# Patient Record
Sex: FEMALE | Race: WHITE | NOT HISPANIC OR LATINO | Employment: OTHER | URBAN - METROPOLITAN AREA
[De-identification: names, ages, dates, MRNs, and addresses within clinical notes are randomized per-mention and may not be internally consistent; named-entity substitution may affect disease eponyms.]

---

## 2023-05-08 ENCOUNTER — TELEPHONE (OUTPATIENT)
Dept: PRIMARY CARE | Facility: CLINIC | Age: 63
End: 2023-05-08
Payer: COMMERCIAL

## 2023-05-08 DIAGNOSIS — G44.209 TENSION HEADACHE: Primary | ICD-10-CM

## 2023-05-08 NOTE — TELEPHONE ENCOUNTER
"Patient calling in stating that she gets headaches that are \"inflammation\" and Dr. Shelley usually calls in a z pack for her. Patient states that she has been getting headaches at night and would like to know if Dr. Shelley would call her in a zpack?    "

## 2023-05-09 RX ORDER — METHYLPREDNISOLONE 4 MG/1
TABLET ORAL
Qty: 21 TABLET | Refills: 0 | Status: SHIPPED | OUTPATIENT
Start: 2023-05-09 | End: 2023-09-27 | Stop reason: ALTCHOICE

## 2023-06-01 DIAGNOSIS — R00.2 PALPITATIONS: ICD-10-CM

## 2023-06-01 PROBLEM — L03.119 CELLULITIS OF LOWER EXTREMITY: Status: ACTIVE | Noted: 2023-06-01

## 2023-06-01 PROBLEM — R51.9 HEADACHE DISORDER: Status: RESOLVED | Noted: 2023-06-01 | Resolved: 2023-06-01

## 2023-06-01 PROBLEM — M79.605 DIFFUSE PAIN IN LEFT LOWER EXTREMITY: Status: RESOLVED | Noted: 2023-06-01 | Resolved: 2023-06-01

## 2023-06-01 PROBLEM — I83.90 VARICOSE VEINS OF LOWER EXTREMITY: Status: ACTIVE | Noted: 2023-06-01

## 2023-06-01 PROBLEM — N39.0 INFECTION OF URINARY TRACT: Status: RESOLVED | Noted: 2023-06-01 | Resolved: 2023-06-01

## 2023-06-01 PROBLEM — J34.89 NASAL CONGESTION WITH RHINORRHEA: Status: RESOLVED | Noted: 2023-06-01 | Resolved: 2023-06-01

## 2023-06-01 PROBLEM — H69.91 DYSFUNCTION OF RIGHT EUSTACHIAN TUBE: Status: ACTIVE | Noted: 2023-06-01

## 2023-06-01 PROBLEM — D21.9 FIBROIDS: Status: ACTIVE | Noted: 2023-06-01

## 2023-06-01 PROBLEM — R20.2 PARESTHESIA: Status: ACTIVE | Noted: 2023-06-01

## 2023-06-01 PROBLEM — S61.012A THUMB LACERATION, LEFT, INITIAL ENCOUNTER: Status: RESOLVED | Noted: 2023-06-01 | Resolved: 2023-06-01

## 2023-06-01 PROBLEM — L03.032 PARONYCHIA OF TOE OF LEFT FOOT: Status: RESOLVED | Noted: 2023-06-01 | Resolved: 2023-06-01

## 2023-06-01 PROBLEM — E66.3 OVERWEIGHT: Status: RESOLVED | Noted: 2023-06-01 | Resolved: 2023-06-01

## 2023-06-01 PROBLEM — K29.70 GASTRITIS: Status: ACTIVE | Noted: 2023-06-01

## 2023-06-01 PROBLEM — R07.89 ATYPICAL CHEST PAIN: Status: ACTIVE | Noted: 2023-06-01

## 2023-06-01 PROBLEM — E55.9 VITAMIN D DEFICIENCY: Status: ACTIVE | Noted: 2023-06-01

## 2023-06-01 PROBLEM — N92.0 MENORRHAGIA: Status: ACTIVE | Noted: 2023-06-01

## 2023-06-01 PROBLEM — M62.838 NECK MUSCLE SPASM: Status: RESOLVED | Noted: 2023-06-01 | Resolved: 2023-06-01

## 2023-06-01 PROBLEM — F43.20 GRIEF REACTION: Status: RESOLVED | Noted: 2023-06-01 | Resolved: 2023-06-01
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
## 2023-06-01 PROBLEM — R05.9 COUGH: Status: RESOLVED | Noted: 2023-06-01 | Resolved: 2023-06-01

## 2023-06-01 PROBLEM — H90.3 ASYMMETRICAL SENSORINEURAL HEARING LOSS: Status: ACTIVE | Noted: 2023-06-01

## 2023-06-01 PROBLEM — J45.901 EXACERBATION OF ASTHMA (HHS-HCC): Status: ACTIVE | Noted: 2023-06-01

## 2023-06-01 PROBLEM — U07.1 COVID-19: Status: RESOLVED | Noted: 2023-06-01 | Resolved: 2023-06-01

## 2023-06-01 PROBLEM — H81.391 PERIPHERAL VERTIGO INVOLVING RIGHT EAR: Status: ACTIVE | Noted: 2023-06-01

## 2023-06-01 PROBLEM — R79.89 LOW TSH LEVEL: Status: ACTIVE | Noted: 2023-06-01

## 2023-06-01 PROBLEM — R93.0 ABNORMAL MRI OF THE HEAD: Status: RESOLVED | Noted: 2023-06-01 | Resolved: 2023-06-01

## 2023-06-01 PROBLEM — G44.209 TENSION-TYPE HEADACHE: Status: ACTIVE | Noted: 2023-06-01

## 2023-06-01 PROBLEM — N95.2 ATROPHIC VAGINITIS: Status: ACTIVE | Noted: 2023-06-01

## 2023-06-01 PROBLEM — H93.11 TINNITUS, RIGHT: Status: ACTIVE | Noted: 2023-06-01

## 2023-06-01 PROBLEM — M67.432 GANGLION OF LEFT WRIST: Status: RESOLVED | Noted: 2023-06-01 | Resolved: 2023-06-01

## 2023-06-01 PROBLEM — L72.3 INFLAMED SEBACEOUS CYST: Status: RESOLVED | Noted: 2023-06-01 | Resolved: 2023-06-01

## 2023-06-01 PROBLEM — R09.81 NASAL CONGESTION WITH RHINORRHEA: Status: RESOLVED | Noted: 2023-06-01 | Resolved: 2023-06-01

## 2023-06-01 PROBLEM — F41.9 ANXIETY DISORDER: Status: ACTIVE | Noted: 2023-06-01

## 2023-06-01 PROBLEM — F43.21 GRIEF REACTION: Status: RESOLVED | Noted: 2023-06-01 | Resolved: 2023-06-01

## 2023-06-01 PROBLEM — E04.2 MULTIPLE THYROID NODULES: Status: ACTIVE | Noted: 2023-06-01

## 2023-06-01 PROBLEM — J01.00 ACUTE NON-RECURRENT MAXILLARY SINUSITIS: Status: RESOLVED | Noted: 2023-06-01 | Resolved: 2023-06-01

## 2023-06-01 PROBLEM — R42 VERTIGO: Status: ACTIVE | Noted: 2023-06-01

## 2023-06-01 RX ORDER — METRONIDAZOLE 500 MG/1
500 TABLET ORAL EVERY 8 HOURS
Qty: 21 TABLET | Refills: 0 | COMMUNITY
Start: 2023-04-30 | End: 2023-05-07

## 2023-06-01 RX ORDER — OMEPRAZOLE 40 MG/1
40 CAPSULE, DELAYED RELEASE ORAL 2 TIMES DAILY
COMMUNITY
Start: 2022-12-18 | End: 2024-03-21 | Stop reason: SDUPTHER

## 2023-06-01 RX ORDER — MONTELUKAST SODIUM 10 MG/1
10 TABLET ORAL NIGHTLY
COMMUNITY
End: 2024-06-03 | Stop reason: SDUPTHER

## 2023-06-01 RX ORDER — TIMOLOL MALEATE 5 MG/ML
SOLUTION/ DROPS OPHTHALMIC
COMMUNITY
Start: 2023-04-30

## 2023-06-01 RX ORDER — SUCRALFATE 1 G/1
TABLET ORAL
COMMUNITY
Start: 2022-10-14

## 2023-06-01 RX ORDER — PROPRANOLOL HYDROCHLORIDE 60 MG/1
60 CAPSULE, EXTENDED RELEASE ORAL DAILY
Qty: 90 CAPSULE | Refills: 3 | Status: SHIPPED | OUTPATIENT
Start: 2023-06-01 | End: 2024-06-03 | Stop reason: SDUPTHER

## 2023-06-01 RX ORDER — FLUTICASONE PROPIONATE 50 MCG
SPRAY, SUSPENSION (ML) NASAL
COMMUNITY
Start: 2020-09-02

## 2023-06-01 RX ORDER — ALBUTEROL SULFATE 90 UG/1
AEROSOL, METERED RESPIRATORY (INHALATION)
COMMUNITY
End: 2024-03-07 | Stop reason: SDUPTHER

## 2023-06-01 RX ORDER — PROPRANOLOL HYDROCHLORIDE 60 MG/1
60 CAPSULE, EXTENDED RELEASE ORAL DAILY
COMMUNITY
End: 2023-06-01 | Stop reason: SDUPTHER

## 2023-06-01 RX ORDER — ESTRADIOL 10 UG/1
INSERT VAGINAL
COMMUNITY
Start: 2019-10-10

## 2023-06-14 ENCOUNTER — OFFICE VISIT (OUTPATIENT)
Dept: PRIMARY CARE | Facility: CLINIC | Age: 63
End: 2023-06-14
Payer: COMMERCIAL

## 2023-06-14 VITALS
HEART RATE: 71 BPM | DIASTOLIC BLOOD PRESSURE: 60 MMHG | TEMPERATURE: 95.3 F | WEIGHT: 241 LBS | OXYGEN SATURATION: 100 % | SYSTOLIC BLOOD PRESSURE: 100 MMHG | BODY MASS INDEX: 47.32 KG/M2 | HEIGHT: 60 IN

## 2023-06-14 DIAGNOSIS — H33.322 RETINA HOLE, LEFT: ICD-10-CM

## 2023-06-14 DIAGNOSIS — H93.233 HYPERACUSIS OF BOTH EARS: ICD-10-CM

## 2023-06-14 DIAGNOSIS — F41.1 GENERALIZED ANXIETY DISORDER: ICD-10-CM

## 2023-06-14 DIAGNOSIS — H90.3 ASYMMETRICAL SENSORINEURAL HEARING LOSS: ICD-10-CM

## 2023-06-14 DIAGNOSIS — K57.32 DIVERTICULITIS OF LARGE INTESTINE WITHOUT PERFORATION OR ABSCESS WITHOUT BLEEDING: Primary | ICD-10-CM

## 2023-06-14 PROCEDURE — 99214 OFFICE O/P EST MOD 30 MIN: CPT | Performed by: FAMILY MEDICINE

## 2023-06-14 ASSESSMENT — PATIENT HEALTH QUESTIONNAIRE - PHQ9
1. LITTLE INTEREST OR PLEASURE IN DOING THINGS: NOT AT ALL
SUM OF ALL RESPONSES TO PHQ9 QUESTIONS 1 AND 2: 0
2. FEELING DOWN, DEPRESSED OR HOPELESS: NOT AT ALL

## 2023-06-14 ASSESSMENT — PAIN SCALES - GENERAL: PAINLEVEL: 3

## 2023-06-14 NOTE — PROGRESS NOTES
Subjective   Patient ID: Lucia Shields is a 62 y.o. female.    Patient comes in today with multiple complaints.  She has a history of diverticulosis and feels like she had a flareup. She was seen elsewhere and that is much better.  She would like to see a gastroenterologist.  She may be due for scope.  She has no diarrhea or blood in the stool at this time.  She is overweight with a BMI of 47.  She has been trying to lose weight through exercise and caloric restriction.  She does not smoke.  She drinks socially.  Her asthma has been stable.  She has a history of anxiety.  She would like to see an audiologist due to hearing loss.  She has seen cardiology for history of palpitations and chest pain.  Most likely due to anxiety.        Review of Systems   Constitutional:  Positive for fatigue. Negative for fever and unexpected weight change.   HENT:  Positive for hearing loss. Negative for congestion, ear pain, sore throat and trouble swallowing.    Eyes:  Negative for pain and visual disturbance.   Respiratory:  Negative for cough and shortness of breath.    Cardiovascular:  Negative for chest pain, palpitations and leg swelling.   Gastrointestinal:  Positive for abdominal pain. Negative for blood in stool, diarrhea, nausea and vomiting.   Genitourinary:  Negative for dysuria, frequency, hematuria and urgency.   Musculoskeletal:  Negative for joint swelling.   Skin:  Negative for pallor and rash.   Neurological:  Negative for dizziness, syncope, weakness, numbness and headaches.   Psychiatric/Behavioral:  Negative for confusion, decreased concentration, hallucinations and suicidal ideas. The patient is nervous/anxious.      Vitals:    06/14/23 1611   BP: 100/60   Pulse: 71   Temp: 35.2 °C (95.3 °F)   SpO2: 100%      Objective   Physical Exam  Constitutional:       Appearance: Normal appearance. She is obese.   Cardiovascular:      Rate and Rhythm: Normal rate and regular rhythm.      Heart sounds: Normal heart  sounds.   Pulmonary:      Effort: Pulmonary effort is normal.      Breath sounds: Normal breath sounds.   Musculoskeletal:      Cervical back: Neck supple.   Skin:     General: Skin is warm and dry.   Neurological:      General: No focal deficit present.      Mental Status: She is alert.   Psychiatric:         Mood and Affect: Mood normal.         Speech: Speech normal.         Behavior: Behavior normal.         Cognition and Memory: Cognition normal.         Assessment/Plan   There are no diagnoses linked to this encounter.

## 2023-06-19 PROBLEM — H93.233 HYPERACUSIS OF BOTH EARS: Status: ACTIVE | Noted: 2023-06-19

## 2023-06-19 PROBLEM — K57.32 DIVERTICULITIS OF LARGE INTESTINE WITHOUT PERFORATION OR ABSCESS WITHOUT BLEEDING: Status: ACTIVE | Noted: 2023-06-19

## 2023-06-19 PROBLEM — K57.92 DIVERTICULITIS: Status: ACTIVE | Noted: 2023-06-19

## 2023-06-19 ASSESSMENT — ENCOUNTER SYMPTOMS
ABDOMINAL PAIN: 1
NERVOUS/ANXIOUS: 1
NUMBNESS: 0
SHORTNESS OF BREATH: 0
WEAKNESS: 0
JOINT SWELLING: 0
DIZZINESS: 0
FATIGUE: 1
FEVER: 0
FREQUENCY: 0
VOMITING: 0
SORE THROAT: 0
DECREASED CONCENTRATION: 0
DIARRHEA: 0
DYSURIA: 0
UNEXPECTED WEIGHT CHANGE: 0
HALLUCINATIONS: 0
HEMATURIA: 0
BLOOD IN STOOL: 0
PALPITATIONS: 0
TROUBLE SWALLOWING: 0
HEADACHES: 0
CONFUSION: 0
EYE PAIN: 0
COUGH: 0
NAUSEA: 0

## 2023-06-26 ENCOUNTER — LAB (OUTPATIENT)
Dept: LAB | Facility: LAB | Age: 63
End: 2023-06-26
Payer: COMMERCIAL

## 2023-06-26 DIAGNOSIS — K57.32 DIVERTICULITIS OF LARGE INTESTINE WITHOUT PERFORATION OR ABSCESS WITHOUT BLEEDING: ICD-10-CM

## 2023-06-26 DIAGNOSIS — Z00.00 ENCOUNTER FOR HEALTH MAINTENANCE EXAMINATION: ICD-10-CM

## 2023-06-26 LAB
ALANINE AMINOTRANSFERASE (SGPT) (U/L) IN SER/PLAS: 12 U/L (ref 7–45)
ALBUMIN (G/DL) IN SER/PLAS: 4 G/DL (ref 3.4–5)
ALKALINE PHOSPHATASE (U/L) IN SER/PLAS: 58 U/L (ref 33–136)
ANION GAP IN SER/PLAS: 10 MMOL/L (ref 10–20)
APPEARANCE, URINE: CLEAR
ASPARTATE AMINOTRANSFERASE (SGOT) (U/L) IN SER/PLAS: 12 U/L (ref 9–39)
BASOPHILS (10*3/UL) IN BLOOD BY AUTOMATED COUNT: 0.03 X10E9/L (ref 0–0.1)
BASOPHILS/100 LEUKOCYTES IN BLOOD BY AUTOMATED COUNT: 0.5 % (ref 0–2)
BILIRUBIN TOTAL (MG/DL) IN SER/PLAS: 0.7 MG/DL (ref 0–1.2)
BILIRUBIN, URINE: NEGATIVE
BLOOD, URINE: NEGATIVE
CALCIDIOL (25 OH VITAMIN D3) (NG/ML) IN SER/PLAS: 35 NG/ML
CALCIUM (MG/DL) IN SER/PLAS: 9.3 MG/DL (ref 8.6–10.6)
CARBON DIOXIDE, TOTAL (MMOL/L) IN SER/PLAS: 29 MMOL/L (ref 21–32)
CHLORIDE (MMOL/L) IN SER/PLAS: 108 MMOL/L (ref 98–107)
CHOLESTEROL (MG/DL) IN SER/PLAS: 211 MG/DL (ref 0–199)
CHOLESTEROL IN HDL (MG/DL) IN SER/PLAS: 41.8 MG/DL
CHOLESTEROL/HDL RATIO: 5
COLOR, URINE: YELLOW
CREATININE (MG/DL) IN SER/PLAS: 0.51 MG/DL (ref 0.5–1.05)
EOSINOPHILS (10*3/UL) IN BLOOD BY AUTOMATED COUNT: 0.26 X10E9/L (ref 0–0.7)
EOSINOPHILS/100 LEUKOCYTES IN BLOOD BY AUTOMATED COUNT: 4.5 % (ref 0–6)
ERYTHROCYTE DISTRIBUTION WIDTH (RATIO) BY AUTOMATED COUNT: 13.8 % (ref 11.5–14.5)
ERYTHROCYTE MEAN CORPUSCULAR HEMOGLOBIN CONCENTRATION (G/DL) BY AUTOMATED: 31.5 G/DL (ref 32–36)
ERYTHROCYTE MEAN CORPUSCULAR VOLUME (FL) BY AUTOMATED COUNT: 89 FL (ref 80–100)
ERYTHROCYTES (10*6/UL) IN BLOOD BY AUTOMATED COUNT: 4.38 X10E12/L (ref 4–5.2)
ESTIMATED AVERAGE GLUCOSE FOR HBA1C: 97 MG/DL
GFR FEMALE: >90 ML/MIN/1.73M2
GLUCOSE (MG/DL) IN SER/PLAS: 92 MG/DL (ref 74–99)
GLUCOSE, URINE: NEGATIVE MG/DL
HEMATOCRIT (%) IN BLOOD BY AUTOMATED COUNT: 39.1 % (ref 36–46)
HEMOGLOBIN (G/DL) IN BLOOD: 12.3 G/DL (ref 12–16)
HEMOGLOBIN A1C/HEMOGLOBIN TOTAL IN BLOOD: 5 %
IMMATURE GRANULOCYTES/100 LEUKOCYTES IN BLOOD BY AUTOMATED COUNT: 0.3 % (ref 0–0.9)
KETONES, URINE: NEGATIVE MG/DL
LDL: 135 MG/DL (ref 0–99)
LEUKOCYTE ESTERASE, URINE: NEGATIVE
LEUKOCYTES (10*3/UL) IN BLOOD BY AUTOMATED COUNT: 5.7 X10E9/L (ref 4.4–11.3)
LYMPHOCYTES (10*3/UL) IN BLOOD BY AUTOMATED COUNT: 1.49 X10E9/L (ref 1.2–4.8)
LYMPHOCYTES/100 LEUKOCYTES IN BLOOD BY AUTOMATED COUNT: 26 % (ref 13–44)
MONOCYTES (10*3/UL) IN BLOOD BY AUTOMATED COUNT: 0.48 X10E9/L (ref 0.1–1)
MONOCYTES/100 LEUKOCYTES IN BLOOD BY AUTOMATED COUNT: 8.4 % (ref 2–10)
NEUTROPHILS (10*3/UL) IN BLOOD BY AUTOMATED COUNT: 3.45 X10E9/L (ref 1.2–7.7)
NEUTROPHILS/100 LEUKOCYTES IN BLOOD BY AUTOMATED COUNT: 60.3 % (ref 40–80)
NITRITE, URINE: NEGATIVE
NRBC (PER 100 WBCS) BY AUTOMATED COUNT: 0 /100 WBC (ref 0–0)
PH, URINE: 5 (ref 5–8)
PLATELETS (10*3/UL) IN BLOOD AUTOMATED COUNT: 276 X10E9/L (ref 150–450)
POTASSIUM (MMOL/L) IN SER/PLAS: 4.5 MMOL/L (ref 3.5–5.3)
PROTEIN TOTAL: 6.4 G/DL (ref 6.4–8.2)
PROTEIN, URINE: NEGATIVE MG/DL
SODIUM (MMOL/L) IN SER/PLAS: 142 MMOL/L (ref 136–145)
SPECIFIC GRAVITY, URINE: 1.02 (ref 1–1.03)
THYROTROPIN (MIU/L) IN SER/PLAS BY DETECTION LIMIT <= 0.05 MIU/L: 0.6 MIU/L (ref 0.44–3.98)
TRIGLYCERIDE (MG/DL) IN SER/PLAS: 171 MG/DL (ref 0–149)
UREA NITROGEN (MG/DL) IN SER/PLAS: 17 MG/DL (ref 6–23)
UROBILINOGEN, URINE: <2 MG/DL (ref 0–1.9)
VLDL: 34 MG/DL (ref 0–40)

## 2023-06-26 PROCEDURE — 80061 LIPID PANEL: CPT

## 2023-06-26 PROCEDURE — 84443 ASSAY THYROID STIM HORMONE: CPT

## 2023-06-26 PROCEDURE — 82306 VITAMIN D 25 HYDROXY: CPT

## 2023-06-26 PROCEDURE — 80053 COMPREHEN METABOLIC PANEL: CPT

## 2023-06-26 PROCEDURE — 36415 COLL VENOUS BLD VENIPUNCTURE: CPT

## 2023-06-26 PROCEDURE — 85025 COMPLETE CBC W/AUTO DIFF WBC: CPT

## 2023-06-26 PROCEDURE — 81003 URINALYSIS AUTO W/O SCOPE: CPT

## 2023-06-26 PROCEDURE — 83036 HEMOGLOBIN GLYCOSYLATED A1C: CPT

## 2023-09-27 ENCOUNTER — OFFICE VISIT (OUTPATIENT)
Dept: PRIMARY CARE | Facility: CLINIC | Age: 63
End: 2023-09-27
Payer: COMMERCIAL

## 2023-09-27 ENCOUNTER — LAB (OUTPATIENT)
Dept: LAB | Facility: LAB | Age: 63
End: 2023-09-27
Payer: COMMERCIAL

## 2023-09-27 VITALS
SYSTOLIC BLOOD PRESSURE: 120 MMHG | OXYGEN SATURATION: 97 % | DIASTOLIC BLOOD PRESSURE: 70 MMHG | BODY MASS INDEX: 36.15 KG/M2 | HEART RATE: 83 BPM | HEIGHT: 65 IN | TEMPERATURE: 97.6 F | WEIGHT: 217 LBS

## 2023-09-27 DIAGNOSIS — Z23 NEED FOR VACCINATION: ICD-10-CM

## 2023-09-27 DIAGNOSIS — Z00.00 ENCOUNTER FOR HEALTH MAINTENANCE EXAMINATION: ICD-10-CM

## 2023-09-27 DIAGNOSIS — E05.90 HYPERTHYROIDISM: ICD-10-CM

## 2023-09-27 DIAGNOSIS — G44.209 TENSION HEADACHE: Primary | ICD-10-CM

## 2023-09-27 DIAGNOSIS — M54.50 ACUTE LEFT-SIDED LOW BACK PAIN WITHOUT SCIATICA: ICD-10-CM

## 2023-09-27 DIAGNOSIS — R35.0 URINARY FREQUENCY: ICD-10-CM

## 2023-09-27 DIAGNOSIS — H93.13 TINNITUS OF BOTH EARS: ICD-10-CM

## 2023-09-27 DIAGNOSIS — M25.471 RIGHT ANKLE SWELLING: ICD-10-CM

## 2023-09-27 DIAGNOSIS — H93.11 TINNITUS, RIGHT: ICD-10-CM

## 2023-09-27 PROBLEM — Z86.0100 HISTORY OF COLONIC POLYPS: Status: ACTIVE | Noted: 2023-09-27

## 2023-09-27 PROBLEM — N81.2 CERVICAL PROLAPSE: Status: ACTIVE | Noted: 2017-02-07

## 2023-09-27 PROBLEM — R42 VERTIGO: Status: RESOLVED | Noted: 2023-06-01 | Resolved: 2023-09-27

## 2023-09-27 PROBLEM — Z86.39 HISTORY OF HYPERPARATHYROIDISM: Status: ACTIVE | Noted: 2017-02-17

## 2023-09-27 PROBLEM — N99.3 VAGINAL VAULT PROLAPSE AFTER HYSTERECTOMY: Status: ACTIVE | Noted: 2017-01-31

## 2023-09-27 PROBLEM — Z86.010 HISTORY OF COLONIC POLYPS: Status: ACTIVE | Noted: 2023-09-27

## 2023-09-27 PROBLEM — Z86.39 HISTORY OF IRON DEFICIENCY: Status: ACTIVE | Noted: 2023-09-27

## 2023-09-27 PROBLEM — G43.909 MIGRAINE: Status: ACTIVE | Noted: 2023-09-27

## 2023-09-27 PROBLEM — R87.619 ATYPICAL GLANDULAR CELLS OF UNDETERMINED SIGNIFICANCE (AGUS) ON CERVICAL PAP SMEAR: Status: ACTIVE | Noted: 2017-01-31

## 2023-09-27 PROBLEM — N39.3 STRESS INCONTINENCE OF URINE: Status: ACTIVE | Noted: 2017-01-31

## 2023-09-27 PROBLEM — I49.3 VENTRICULAR PREMATURE CONTRACTIONS: Status: ACTIVE | Noted: 2023-09-27

## 2023-09-27 PROBLEM — J30.2 SEASONAL ALLERGIES: Status: ACTIVE | Noted: 2017-01-31

## 2023-09-27 LAB
APPEARANCE, URINE: CLEAR
BACTERIA, URINE: ABNORMAL /HPF
BILIRUBIN, URINE: NEGATIVE
BLOOD, URINE: NEGATIVE
COLOR, URINE: ABNORMAL
GLUCOSE, URINE: NEGATIVE MG/DL
KETONES, URINE: NEGATIVE MG/DL
LEUKOCYTE ESTERASE, URINE: ABNORMAL
MUCUS, URINE: ABNORMAL /LPF
NITRITE, URINE: NEGATIVE
PH, URINE: 6 (ref 5–8)
PROTEIN, URINE: NEGATIVE MG/DL
RBC, URINE: 1 /HPF (ref 0–5)
SPECIFIC GRAVITY, URINE: 1.01 (ref 1–1.03)
SQUAMOUS EPITHELIAL CELLS, URINE: 1 /HPF
THYROTROPIN (MIU/L) IN SER/PLAS BY DETECTION LIMIT <= 0.05 MIU/L: 0.67 MIU/L (ref 0.44–3.98)
THYROXINE (T4) FREE (NG/DL) IN SER/PLAS: 0.81 NG/DL (ref 0.61–1.12)
UROBILINOGEN, URINE: <2 MG/DL (ref 0–1.9)
WBC, URINE: 4 /HPF (ref 0–5)

## 2023-09-27 PROCEDURE — 90471 IMMUNIZATION ADMIN: CPT | Performed by: FAMILY MEDICINE

## 2023-09-27 PROCEDURE — 84439 ASSAY OF FREE THYROXINE: CPT

## 2023-09-27 PROCEDURE — 81001 URINALYSIS AUTO W/SCOPE: CPT

## 2023-09-27 PROCEDURE — 84481 FREE ASSAY (FT-3): CPT

## 2023-09-27 PROCEDURE — 99214 OFFICE O/P EST MOD 30 MIN: CPT | Performed by: FAMILY MEDICINE

## 2023-09-27 PROCEDURE — 36415 COLL VENOUS BLD VENIPUNCTURE: CPT

## 2023-09-27 PROCEDURE — 84443 ASSAY THYROID STIM HORMONE: CPT

## 2023-09-27 PROCEDURE — 90686 IIV4 VACC NO PRSV 0.5 ML IM: CPT | Performed by: FAMILY MEDICINE

## 2023-09-27 RX ORDER — MULTIVITAMIN
TABLET ORAL EVERY 24 HOURS
COMMUNITY

## 2023-09-27 RX ORDER — CITALOPRAM 20 MG/1
TABLET, FILM COATED ORAL EVERY 24 HOURS
COMMUNITY

## 2023-09-27 RX ORDER — TIZANIDINE 4 MG/1
4 TABLET ORAL EVERY 8 HOURS PRN
Qty: 60 TABLET | Refills: 0 | Status: SHIPPED | OUTPATIENT
Start: 2023-09-27 | End: 2023-10-07

## 2023-09-27 ASSESSMENT — ENCOUNTER SYMPTOMS
MYALGIAS: 1
EYE PAIN: 1
ABDOMINAL PAIN: 0
PALPITATIONS: 0
SORE THROAT: 0
NECK PAIN: 1
HEMATURIA: 0
HALLUCINATIONS: 0
NAUSEA: 0
ARTHRALGIAS: 1
CONFUSION: 0
FEVER: 0
BLOOD IN STOOL: 0
COUGH: 0
DIARRHEA: 0
CHEST TIGHTNESS: 0
DECREASED CONCENTRATION: 0
FREQUENCY: 0
FATIGUE: 1
SHORTNESS OF BREATH: 0
WEAKNESS: 0
UNEXPECTED WEIGHT CHANGE: 0
VOMITING: 0
DYSURIA: 0
JOINT SWELLING: 0
HEADACHES: 1
BACK PAIN: 1
NECK STIFFNESS: 1
NUMBNESS: 0
TROUBLE SWALLOWING: 0
DIZZINESS: 0
SINUS PAIN: 1
NERVOUS/ANXIOUS: 1

## 2023-09-27 ASSESSMENT — PATIENT HEALTH QUESTIONNAIRE - PHQ9
1. LITTLE INTEREST OR PLEASURE IN DOING THINGS: NOT AT ALL
2. FEELING DOWN, DEPRESSED OR HOPELESS: NOT AT ALL
SUM OF ALL RESPONSES TO PHQ9 QUESTIONS 1 AND 2: 0

## 2023-09-27 ASSESSMENT — PAIN SCALES - GENERAL: PAINLEVEL: 0-NO PAIN

## 2023-09-27 NOTE — PATIENT INSTRUCTIONS
It was nice to see you today!  Discussed current concerns and addressed   Reviewed recent labs and diagnostics  Reviewed medications list  Continue to eat a healthy diet, exercise at least 3 times a week or more  Plan and follow up discussed  For any further information related to your condition, copy and paste or go to [email or web address]  Send to PT for neck and back  Muscle relaxant  To ENT for hearing, ringing, discussed most likely hearing loss, not much to do about it  Work on weight loss  Recheck thyroid function, get back in with endo  If palpitations persist back to cardio

## 2023-09-27 NOTE — PROGRESS NOTES
Subjective   Patient ID: Lucia Shields is a 62 y.o. female.    Patient comes in with numerous complaints.  She has a history of palpitations and has had a full cardiac work-up including Holter monitor.  She was put on propranolol extended release 60 mg daily.  She still complains of them and I encouraged her to go back to the cardiologist.  She is not taking excessive amounts of coffee. She has a history of suppressed TSH and has had visits with endocrine.  Her last TSH was 3 months ago and was 0.6.  We will check again.  She has no diarrhea.  She has chronic anxiety.  She has swelling of the right ankle.  She has tension and pain in the shoulders and bilateral temples and a throbbing behind the right eye.  She was recently diagnosed with glaucoma and she is on drops and we discussed that low, usually does not cause headaches and pain.  She has persistent ringing in her ear and probably hearing loss and would like to see ENT.  She has a left low back pain.  She is on the computer all day at work.  BMI is 36.11.  She is not formally exercising.  She does not smoke and she drinks socially.        Review of Systems   Constitutional:  Positive for fatigue. Negative for fever and unexpected weight change.   HENT:  Positive for hearing loss, sinus pain and tinnitus. Negative for congestion, ear pain, sore throat and trouble swallowing.    Eyes:  Positive for pain. Negative for visual disturbance.   Respiratory:  Negative for cough, chest tightness and shortness of breath.    Cardiovascular:  Negative for chest pain, palpitations and leg swelling.   Gastrointestinal:  Negative for abdominal pain, blood in stool, diarrhea, nausea and vomiting.   Genitourinary:  Negative for dysuria, frequency, hematuria and urgency.   Musculoskeletal:  Positive for arthralgias, back pain, myalgias, neck pain and neck stiffness. Negative for joint swelling.   Skin:  Negative for pallor and rash.   Neurological:  Positive for headaches.  Negative for dizziness, syncope, weakness and numbness.   Psychiatric/Behavioral:  Negative for confusion, decreased concentration, hallucinations and suicidal ideas. The patient is nervous/anxious.      Vitals:    09/27/23 1038   BP: 120/70   Pulse: 83   Temp: 36.4 °C (97.6 °F)   SpO2: 97%      Objective   Physical Exam  Constitutional:       Appearance: Normal appearance.   HENT:      Head: Normocephalic and atraumatic.      Right Ear: Tympanic membrane and external ear normal.      Left Ear: Tympanic membrane and external ear normal.      Nose: Nose normal.      Mouth/Throat:      Mouth: Mucous membranes are moist.      Pharynx: Oropharynx is clear. No oropharyngeal exudate.   Eyes:      Extraocular Movements: Extraocular movements intact.      Conjunctiva/sclera: Conjunctivae normal.      Pupils: Pupils are equal, round, and reactive to light.   Cardiovascular:      Rate and Rhythm: Normal rate and regular rhythm.      Heart sounds: Normal heart sounds.   Pulmonary:      Effort: Pulmonary effort is normal.      Breath sounds: Normal breath sounds.   Abdominal:      General: Abdomen is flat.      Palpations: Abdomen is soft. There is no mass.      Tenderness: There is no abdominal tenderness. There is no guarding.   Musculoskeletal:         General: Tenderness present.      Cervical back: Neck supple. Tenderness present.      Comments: Trapezius tender B, neck tender, temporalis tender B   Lymphadenopathy:      Cervical: No cervical adenopathy.   Skin:     General: Skin is warm and dry.   Neurological:      General: No focal deficit present.      Mental Status: She is alert.   Psychiatric:         Mood and Affect: Mood normal.         Speech: Speech normal.         Behavior: Behavior normal.         Cognition and Memory: Cognition normal.         Assessment/Plan   There are no diagnoses linked to this encounter.

## 2023-09-28 LAB — TRIIODOTHYRONINE (T3) FREE (PG/ML) IN SER/PLAS: 3.8 PG/ML (ref 2.3–4.2)

## 2023-10-20 ENCOUNTER — OFFICE VISIT (OUTPATIENT)
Dept: PRIMARY CARE | Facility: CLINIC | Age: 63
End: 2023-10-20
Payer: COMMERCIAL

## 2023-10-20 VITALS
TEMPERATURE: 96.6 F | DIASTOLIC BLOOD PRESSURE: 76 MMHG | HEART RATE: 65 BPM | SYSTOLIC BLOOD PRESSURE: 118 MMHG | OXYGEN SATURATION: 98 %

## 2023-10-20 DIAGNOSIS — R42 VERTIGO: Primary | ICD-10-CM

## 2023-10-20 DIAGNOSIS — R25.2 MUSCLE CRAMPS: ICD-10-CM

## 2023-10-20 PROCEDURE — 99213 OFFICE O/P EST LOW 20 MIN: CPT | Performed by: INTERNAL MEDICINE

## 2023-10-20 RX ORDER — MECLIZINE HYDROCHLORIDE 25 MG/1
25 TABLET ORAL NIGHTLY
Qty: 10 TABLET | Refills: 0 | Status: SHIPPED | OUTPATIENT
Start: 2023-10-20 | End: 2023-10-30

## 2023-10-20 RX ORDER — METHOCARBAMOL 500 MG/1
500 TABLET, FILM COATED ORAL 4 TIMES DAILY PRN
Qty: 40 TABLET | Refills: 0 | Status: SHIPPED | OUTPATIENT
Start: 2023-10-20 | End: 2023-12-13 | Stop reason: SDUPTHER

## 2023-10-20 ASSESSMENT — ENCOUNTER SYMPTOMS
CONFUSION: 0
NUMBNESS: 0
HALLUCINATIONS: 0
FLANK PAIN: 0
COLOR CHANGE: 0
WOUND: 0
PALPITATIONS: 0
VOICE CHANGE: 0
ABDOMINAL PAIN: 0
APPETITE CHANGE: 0
HEADACHES: 0
COUGH: 0
CHEST TIGHTNESS: 0
CONSTIPATION: 0
ADENOPATHY: 0
WHEEZING: 0
UNEXPECTED WEIGHT CHANGE: 0
MYALGIAS: 1
SPEECH DIFFICULTY: 0
WEAKNESS: 0
NAUSEA: 0
JOINT SWELLING: 0
DIZZINESS: 1
POLYDIPSIA: 0
HEMATURIA: 0
SLEEP DISTURBANCE: 0
FATIGUE: 0
BLOOD IN STOOL: 0
SEIZURES: 0
NECK PAIN: 0
EYE PAIN: 0
VOMITING: 0
DIARRHEA: 0
BACK PAIN: 0
DYSURIA: 0
PHOTOPHOBIA: 0
POLYPHAGIA: 0
SORE THROAT: 0
SHORTNESS OF BREATH: 0
ARTHRALGIAS: 0
FACIAL ASYMMETRY: 0
TREMORS: 0
SINUS PAIN: 0
ACTIVITY CHANGE: 0
TROUBLE SWALLOWING: 0
STRIDOR: 0
NERVOUS/ANXIOUS: 0
FEVER: 0

## 2023-10-20 NOTE — PROGRESS NOTES
Subjective   Patient ID: Lucia Shields is a 62 y.o. female who presents for Vertigo (dizziness).    HPI   Patient presented to the office for Vertigo symptoms. She said that when she moves her head in the bed sometime she feels very dizzy.  She also did not like Tizanidine for muscle cramps given by regular PCP and want to change to methocarbamol.    No hearing loss and ear discharge.    Review of Systems   Constitutional:  Negative for activity change, appetite change, fatigue, fever and unexpected weight change.   HENT:  Negative for dental problem, ear discharge, hearing loss, nosebleeds, postnasal drip, sinus pain, sore throat, trouble swallowing and voice change.    Eyes:  Negative for photophobia, pain and visual disturbance.   Respiratory:  Negative for cough, chest tightness, shortness of breath, wheezing and stridor.    Cardiovascular:  Negative for chest pain, palpitations and leg swelling.   Gastrointestinal:  Negative for abdominal pain, blood in stool, constipation, diarrhea, nausea and vomiting.   Endocrine: Negative for polydipsia, polyphagia and polyuria.   Genitourinary:  Negative for decreased urine volume, dyspareunia, dysuria, flank pain, hematuria and urgency.   Musculoskeletal:  Positive for myalgias. Negative for arthralgias, back pain, gait problem, joint swelling and neck pain.   Skin:  Negative for color change, rash and wound.   Allergic/Immunologic: Negative for environmental allergies and food allergies.   Neurological:  Positive for dizziness. Negative for tremors, seizures, syncope, facial asymmetry, speech difficulty, weakness, numbness and headaches.   Hematological:  Negative for adenopathy.   Psychiatric/Behavioral:  Negative for behavioral problems, confusion, hallucinations, self-injury, sleep disturbance and suicidal ideas. The patient is not nervous/anxious.      Objective   /76   Pulse 65   Temp 35.9 °C (96.6 °F)   SpO2 98%     Physical Exam  Constitutional:        General: She is not in acute distress.     Appearance: Normal appearance. She is obese. She is not ill-appearing or toxic-appearing.   HENT:      Right Ear: Tympanic membrane, ear canal and external ear normal.      Left Ear: Tympanic membrane, ear canal and external ear normal.   Eyes:      General:         Right eye: No discharge.         Left eye: No discharge.      Conjunctiva/sclera: Conjunctivae normal.   Cardiovascular:      Rate and Rhythm: Normal rate and regular rhythm.   Pulmonary:      Effort: Pulmonary effort is normal. No respiratory distress.      Breath sounds: Normal breath sounds. No wheezing, rhonchi or rales.   Musculoskeletal:         General: Normal range of motion.      Cervical back: Normal range of motion. No rigidity.   Skin:     General: Skin is warm.   Neurological:      General: No focal deficit present.      Mental Status: She is alert and oriented to person, place, and time.   Psychiatric:         Mood and Affect: Mood normal.         Behavior: Behavior normal.       Assessment/Plan   Problem List Items Addressed This Visit    None  Visit Diagnoses       Vertigo    -  Primary    Relevant Medications    meclizine (Antivert) 25 mg tablet    Muscle cramps        Relevant Medications    methocarbamol (Robaxin) 500 mg tablet

## 2023-11-05 ENCOUNTER — HOSPITAL ENCOUNTER (EMERGENCY)
Facility: HOSPITAL | Age: 63
Discharge: HOME | End: 2023-11-05
Attending: STUDENT IN AN ORGANIZED HEALTH CARE EDUCATION/TRAINING PROGRAM
Payer: COMMERCIAL

## 2023-11-05 ENCOUNTER — APPOINTMENT (OUTPATIENT)
Dept: RADIOLOGY | Facility: HOSPITAL | Age: 63
End: 2023-11-05
Payer: COMMERCIAL

## 2023-11-05 VITALS
TEMPERATURE: 98.1 F | SYSTOLIC BLOOD PRESSURE: 98 MMHG | BODY MASS INDEX: 36 KG/M2 | RESPIRATION RATE: 17 BRPM | WEIGHT: 216.05 LBS | OXYGEN SATURATION: 99 % | DIASTOLIC BLOOD PRESSURE: 64 MMHG | HEART RATE: 77 BPM | HEIGHT: 65 IN

## 2023-11-05 DIAGNOSIS — R42 LIGHTHEADEDNESS: Primary | ICD-10-CM

## 2023-11-05 LAB
ALBUMIN SERPL-MCNC: 4.5 G/DL (ref 3.5–5)
ALP BLD-CCNC: 74 U/L (ref 35–125)
ALT SERPL-CCNC: 14 U/L (ref 5–40)
ANION GAP SERPL CALC-SCNC: 11 MMOL/L
AST SERPL-CCNC: 12 U/L (ref 5–40)
BASOPHILS # BLD AUTO: 0.02 X10*3/UL (ref 0–0.1)
BASOPHILS NFR BLD AUTO: 0.3 %
BILIRUB SERPL-MCNC: 0.6 MG/DL (ref 0.1–1.2)
BUN SERPL-MCNC: 15 MG/DL (ref 8–25)
CALCIUM SERPL-MCNC: 9.7 MG/DL (ref 8.5–10.4)
CHLORIDE SERPL-SCNC: 101 MMOL/L (ref 97–107)
CO2 SERPL-SCNC: 27 MMOL/L (ref 24–31)
CREAT SERPL-MCNC: 0.5 MG/DL (ref 0.4–1.6)
EOSINOPHIL # BLD AUTO: 0.19 X10*3/UL (ref 0–0.7)
EOSINOPHIL NFR BLD AUTO: 2.8 %
ERYTHROCYTE [DISTWIDTH] IN BLOOD BY AUTOMATED COUNT: 13.5 % (ref 11.5–14.5)
GFR SERPL CREATININE-BSD FRML MDRD: >90 ML/MIN/1.73M*2
GLUCOSE SERPL-MCNC: 95 MG/DL (ref 65–99)
HCT VFR BLD AUTO: 41.2 % (ref 36–46)
HGB BLD-MCNC: 13.2 G/DL (ref 12–16)
IMM GRANULOCYTES # BLD AUTO: 0.03 X10*3/UL (ref 0–0.7)
IMM GRANULOCYTES NFR BLD AUTO: 0.4 % (ref 0–0.9)
LYMPHOCYTES # BLD AUTO: 1.85 X10*3/UL (ref 1.2–4.8)
LYMPHOCYTES NFR BLD AUTO: 27.6 %
MCH RBC QN AUTO: 27.7 PG (ref 26–34)
MCHC RBC AUTO-ENTMCNC: 32 G/DL (ref 32–36)
MCV RBC AUTO: 86 FL (ref 80–100)
MONOCYTES # BLD AUTO: 0.52 X10*3/UL (ref 0.1–1)
MONOCYTES NFR BLD AUTO: 7.8 %
NEUTROPHILS # BLD AUTO: 4.09 X10*3/UL (ref 1.2–7.7)
NEUTROPHILS NFR BLD AUTO: 61.1 %
NRBC BLD-RTO: 0 /100 WBCS (ref 0–0)
PLATELET # BLD AUTO: 288 X10*3/UL (ref 150–450)
POTASSIUM SERPL-SCNC: 4.7 MMOL/L (ref 3.4–5.1)
PROT SERPL-MCNC: 7.6 G/DL (ref 5.9–7.9)
RBC # BLD AUTO: 4.77 X10*6/UL (ref 4–5.2)
SODIUM SERPL-SCNC: 139 MMOL/L (ref 133–145)
TROPONIN T SERPL-MCNC: <6 NG/L
WBC # BLD AUTO: 6.7 X10*3/UL (ref 4.4–11.3)

## 2023-11-05 PROCEDURE — 82565 ASSAY OF CREATININE: CPT | Performed by: STUDENT IN AN ORGANIZED HEALTH CARE EDUCATION/TRAINING PROGRAM

## 2023-11-05 PROCEDURE — 70450 CT HEAD/BRAIN W/O DYE: CPT

## 2023-11-05 PROCEDURE — 99285 EMERGENCY DEPT VISIT HI MDM: CPT | Mod: 25

## 2023-11-05 PROCEDURE — 85025 COMPLETE CBC W/AUTO DIFF WBC: CPT | Performed by: STUDENT IN AN ORGANIZED HEALTH CARE EDUCATION/TRAINING PROGRAM

## 2023-11-05 PROCEDURE — 84484 ASSAY OF TROPONIN QUANT: CPT | Performed by: STUDENT IN AN ORGANIZED HEALTH CARE EDUCATION/TRAINING PROGRAM

## 2023-11-05 PROCEDURE — 36415 COLL VENOUS BLD VENIPUNCTURE: CPT | Performed by: STUDENT IN AN ORGANIZED HEALTH CARE EDUCATION/TRAINING PROGRAM

## 2023-11-05 ASSESSMENT — PAIN - FUNCTIONAL ASSESSMENT: PAIN_FUNCTIONAL_ASSESSMENT: 0-10

## 2023-11-05 ASSESSMENT — COLUMBIA-SUICIDE SEVERITY RATING SCALE - C-SSRS
1. IN THE PAST MONTH, HAVE YOU WISHED YOU WERE DEAD OR WISHED YOU COULD GO TO SLEEP AND NOT WAKE UP?: NO
2. HAVE YOU ACTUALLY HAD ANY THOUGHTS OF KILLING YOURSELF?: NO
6. HAVE YOU EVER DONE ANYTHING, STARTED TO DO ANYTHING, OR PREPARED TO DO ANYTHING TO END YOUR LIFE?: NO

## 2023-11-05 ASSESSMENT — PAIN SCALES - GENERAL: PAINLEVEL_OUTOF10: 0 - NO PAIN

## 2023-11-05 NOTE — ED PROVIDER NOTES
HPI   Chief Complaint   Patient presents with    Dizziness     Dizziness when patient lies down and stands up. Denies vomiting and syncope.       Patient presents with episodes where she feels lightheaded.  She states that she has followed up with her primary care physician and has an appointment with her ear nose throat doctor in 1 week.  She has been to the emergency department twice with similar symptoms.  Her primary care physician gave her a prescription for meclizine which has not been helping.  She reports that she feels a pressure sensation on the top of the head.  When she lays down, she feels as though she is moving when she is not.  When she stands up from laying down, she also feels similar symptoms.                          No data recorded                Patient History   Past Medical History:   Diagnosis Date    Abnormal MRI of the head 2023    COVID-19 2023    Diffuse pain in left lower extremity 2023    Ganglion of left wrist 2023    Headache disorder 2023    Multiple sites, insect bite, nonvenomous 2023    Nasal congestion with rhinorrhea 2023    Neck muscle spasm 2023    Personal history of other endocrine, nutritional and metabolic disease 2015    History of hyperparathyroidism    Personal history of other infectious and parasitic diseases 2015    History of hepatitis    Personal history of urinary calculi 2015    History of renal calculi     Past Surgical History:   Procedure Laterality Date     SECTION, CLASSIC  2015     Section    DILATION AND CURETTAGE OF UTERUS  2015    Dilation And Curettage    LITHOTRIPSY  2015    Renal Lithotripsy    OTHER SURGICAL HISTORY  2015    Parathyroid Resection     No family history on file.  Social History     Tobacco Use    Smoking status: Never    Smokeless tobacco: Never   Substance Use Topics    Alcohol use: Not on file    Drug use: Not on file        Physical Exam   ED Triage Vitals [11/05/23 1056]   Temp Pulse Resp BP   36.7 °C (98.1 °F) -- 18 118/83      SpO2 Temp Source Heart Rate Source Patient Position   97 % Oral Monitor Sitting      BP Location FiO2 (%)     Left arm --       Physical Exam  HENT:      Head: Normocephalic.   Eyes:      Conjunctiva/sclera: Conjunctivae normal.   Cardiovascular:      Rate and Rhythm: Normal rate.   Pulmonary:      Effort: Pulmonary effort is normal.   Neurological:      General: No focal deficit present.      Mental Status: She is alert.       EKG interpreted by me: Normal sinus rhythm, rate 62.  Leftward axis.  No ST or T wave abnormalities  ED Course & MDM   Diagnoses as of 11/05/23 1515   Lightheadedness       Medical Decision Making  Laboratory studies are unremarkable.  Head CT without acute findings.  Patient without acute neurological findings.  Her dizziness is intermittent and in the setting, my suspicion for CNS etiology of dizziness such as CVA is low.  Patient has already been trialed on prescription for meclizine.  Patient does have appointment with ENT next week.  Return precautions given for any worsening symptoms.  Parts of this chart were completed with dictation software, please excuse any errors in transcription.        Procedure  Procedures     Lonnie Marie MD  11/05/23 1512

## 2023-11-05 NOTE — DISCHARGE INSTRUCTIONS
The CAT scan of your head was normal.  You should follow-up with your primary care physician as well as your ear nose throat specialist.  Return to the emergency department with any worsening symptoms.    It is important to remember that your care does not end here and you must continue to monitor your condition closely. Please return to the emergency department for any worsening or concerning signs or symptoms as directed by our conversations and the discharge instructions. If you do not have a doctor please contact the referral number on your discharge instructions. Please contact any physician specialists provided in your discharge notes as it is very important to follow up with them regarding your condition. If you are unable to reach the physicians provided, please come back to the Emergency Department at any time.    Return to emergency room without delay for ANY new or worsening pains or for any other symptoms or concerns.  Return with worsening pains, nausea, vomiting, trouble breathing, palpitations, shortness of breath, inability to pass stool or urine, loss of control of stool or urine, any numbness or tingling (that is not normal for you), uncontrolled fevers, the passing of blood or other material in stool or urine, rashes, pains or for any other symptoms or concerns you may have.  You are always welcome to return to the ER at any time for any reason or for any other concerns you may have.

## 2023-11-13 ENCOUNTER — OFFICE VISIT (OUTPATIENT)
Dept: PRIMARY CARE | Facility: CLINIC | Age: 63
End: 2023-11-13
Payer: COMMERCIAL

## 2023-11-13 VITALS
HEIGHT: 65 IN | TEMPERATURE: 97.4 F | DIASTOLIC BLOOD PRESSURE: 70 MMHG | SYSTOLIC BLOOD PRESSURE: 110 MMHG | WEIGHT: 216 LBS | HEART RATE: 87 BPM | OXYGEN SATURATION: 98 % | BODY MASS INDEX: 35.99 KG/M2

## 2023-11-13 DIAGNOSIS — N30.00 ACUTE CYSTITIS WITHOUT HEMATURIA: Primary | ICD-10-CM

## 2023-11-13 DIAGNOSIS — R39.9 UTI SYMPTOMS: ICD-10-CM

## 2023-11-13 LAB
POC APPEARANCE, URINE: ABNORMAL
POC BILIRUBIN, URINE: NEGATIVE
POC BLOOD, URINE: NEGATIVE
POC COLOR, URINE: YELLOW
POC GLUCOSE, URINE: NEGATIVE MG/DL
POC KETONES, URINE: NEGATIVE MG/DL
POC LEUKOCYTES, URINE: ABNORMAL
POC NITRITE,URINE: POSITIVE
POC PH, URINE: 6 PH
POC PROTEIN, URINE: NEGATIVE MG/DL
POC SPECIFIC GRAVITY, URINE: 1.02
POC UROBILINOGEN, URINE: 0.2 EU/DL

## 2023-11-13 PROCEDURE — 81003 URINALYSIS AUTO W/O SCOPE: CPT | Performed by: INTERNAL MEDICINE

## 2023-11-13 PROCEDURE — 99212 OFFICE O/P EST SF 10 MIN: CPT | Performed by: INTERNAL MEDICINE

## 2023-11-13 RX ORDER — NITROFURANTOIN 25; 75 MG/1; MG/1
100 CAPSULE ORAL 2 TIMES DAILY
Qty: 14 CAPSULE | Refills: 0 | Status: SHIPPED | OUTPATIENT
Start: 2023-11-13 | End: 2023-11-20

## 2023-11-13 ASSESSMENT — ENCOUNTER SYMPTOMS
HEMATURIA: 0
FLANK PAIN: 1
VOMITING: 0
CHILLS: 0
FREQUENCY: 1
NAUSEA: 0

## 2023-11-13 ASSESSMENT — PAIN SCALES - GENERAL: PAINLEVEL: 0-NO PAIN

## 2023-11-13 NOTE — PROGRESS NOTES
"Subjective   Patient ID: Lucia Shields is a 62 y.o. female who presents for UTI (Since last night, back pressure).    UTI   This is a new problem. The current episode started yesterday. The problem occurs every urination. The problem has been gradually worsening. The quality of the pain is described as burning. There has been no fever. Associated symptoms include flank pain, frequency, hesitancy and urgency. Pertinent negatives include no chills, discharge, hematuria, nausea, possible pregnancy or vomiting. She has tried nothing for the symptoms.      Review of Systems   Constitutional:  Negative for chills.   Gastrointestinal:  Negative for nausea and vomiting.   Genitourinary:  Positive for flank pain, frequency, hesitancy and urgency. Negative for hematuria.     Objective   /70   Pulse 87   Temp 36.3 °C (97.4 °F)   Ht 1.651 m (5' 5\")   Wt 98 kg (216 lb)   SpO2 98%   BMI 35.94 kg/m²     Physical Exam  Patient left office saying that she has to leave and couldn't stay even for 5 minutes after giving urine sample. She was offered to come earlier as well so I can see her early.    Assessment/Plan   Problem List Items Addressed This Visit    None  Visit Diagnoses       Acute cystitis without hematuria    -  Primary    Relevant Medications    nitrofurantoin, macrocrystal-monohydrate, (Macrobid) 100 mg capsule    UTI symptoms        Relevant Orders    POCT UA Automated manually resulted (Completed)        UA is positive for UTI so I sent antibiotic to the pharmacy.  "

## 2023-11-15 ENCOUNTER — HOSPITAL ENCOUNTER (OUTPATIENT)
Dept: CARDIOLOGY | Facility: HOSPITAL | Age: 63
Discharge: HOME | End: 2023-11-15
Payer: COMMERCIAL

## 2023-11-15 LAB
ATRIAL RATE: 62 BPM
P AXIS: 37 DEGREES
P OFFSET: 177 MS
P ONSET: 128 MS
PR INTERVAL: 174 MS
Q ONSET: 215 MS
QRS COUNT: 11 BEATS
QRS DURATION: 86 MS
QT INTERVAL: 398 MS
QTC CALCULATION(BAZETT): 403 MS
QTC FREDERICIA: 402 MS
R AXIS: -48 DEGREES
T AXIS: -6 DEGREES
T OFFSET: 414 MS
VENTRICULAR RATE: 62 BPM

## 2023-11-15 PROCEDURE — 93005 ELECTROCARDIOGRAM TRACING: CPT

## 2023-11-16 ENCOUNTER — APPOINTMENT (OUTPATIENT)
Dept: OTOLARYNGOLOGY | Facility: CLINIC | Age: 63
End: 2023-11-16
Payer: COMMERCIAL

## 2023-11-17 ENCOUNTER — APPOINTMENT (OUTPATIENT)
Dept: OTOLARYNGOLOGY | Facility: CLINIC | Age: 63
End: 2023-11-17
Payer: COMMERCIAL

## 2023-12-13 DIAGNOSIS — R25.2 MUSCLE CRAMPS: ICD-10-CM

## 2023-12-13 RX ORDER — METHOCARBAMOL 500 MG/1
500 TABLET, FILM COATED ORAL 4 TIMES DAILY PRN
Qty: 40 TABLET | Refills: 0 | Status: SHIPPED | OUTPATIENT
Start: 2023-12-13 | End: 2024-01-12

## 2023-12-20 ENCOUNTER — OFFICE VISIT (OUTPATIENT)
Dept: OTOLARYNGOLOGY | Facility: CLINIC | Age: 63
End: 2023-12-20
Payer: COMMERCIAL

## 2023-12-20 DIAGNOSIS — H93.13 TINNITUS OF BOTH EARS: ICD-10-CM

## 2023-12-20 DIAGNOSIS — R42 DIZZINESS: ICD-10-CM

## 2023-12-20 DIAGNOSIS — H91.93 DECREASED HEARING OF BOTH EARS: Primary | ICD-10-CM

## 2023-12-20 DIAGNOSIS — H81.12 BENIGN PAROXYSMAL POSITIONAL VERTIGO OF LEFT EAR: ICD-10-CM

## 2023-12-20 PROCEDURE — 99203 OFFICE O/P NEW LOW 30 MIN: CPT | Performed by: OTOLARYNGOLOGY

## 2023-12-20 PROCEDURE — 95992 CANALITH REPOSITIONING PROC: CPT | Performed by: OTOLARYNGOLOGY

## 2023-12-20 NOTE — PROGRESS NOTES
History Of Present Illness  Lucia Shields is a 62 y.o. female presenting with poor hearing. She is kindly referred by Dr. Stephanie Shelley. About 7 years ago she woke up with loud tinnitus and hearing loss. She has bilateral hearing loss, worse in her right ear.     She recently has dizziness. She had a CT scan on 2023. It was essentially normal.   She feels dizzy when she lays back.  She has glaucoma. Has retinal disease. Using eye drops. Eye drops make her dizzy too.  She has thyroid disease, uses propranolol.    On examination, TMs look intact.  Nasal septum deviated to left  No postnasal discharge  No palpable neck mass.    [place]-Hallpike maneuver has shown BPPV.  Left Epley maneuver was performed.     Plan:  1- hearing test  2- follow up after the test  3- BPPV recommendations    Past Medical History  She has a past medical history of Abnormal MRI of the head (2023), COVID-19 (2023), Diffuse pain in left lower extremity (2023), Ganglion of left wrist (2023), Headache disorder (2023), Multiple sites, insect bite, nonvenomous (2023), Nasal congestion with rhinorrhea (2023), Neck muscle spasm (2023), Personal history of other endocrine, nutritional and metabolic disease (2015), Personal history of other infectious and parasitic diseases (2015), and Personal history of urinary calculi (2015).    Surgical History  She has a past surgical history that includes  section, classic (2015); Other surgical history (2015); Lithotripsy (2015); and Dilation and curettage of uterus (2015).     Social History  She reports that she has never smoked. She has never used smokeless tobacco. No history on file for alcohol use and drug use.    Family History  No family history on file.     Allergies  Pollen extracts    Review of Systems   Difficulty hearing  Vertigo  Tinnitus  Palpitations       Physical Exam    General  appearance: Healthy-appearing, well-nourished, well groomed, in no acute distress.     Head and Face: Atraumatic with no masses, lesions, or scarring.      Salivary glands: No tenderness of the parotid glands or parotid masses.     No tenderness of the submandibular glands or submandibular masses.      Facial strength: Normal strength and symmetry, no synkinesis or facial tic.     Eyes: Conjunctivas look non-hyperemic bilaterally    Ears: Bilaterally ear canals look normal. Tympanic membranes look intact, no hyperemia, fluid or retraction. Hearing grossly normal.      Nose: Mucosa looks normal. No purulent discharge. Septum deviated to left.     Oral Cavity/Mouth: Lips and tongue look normal.     Throat: No postnasal discharge. No tonsil hypertrophy. No hyperemia.    Neck: Symmetrical, trachea midline.     Pulmonary: Normal respiratory effort.     Lymphatic: No palpable pathologic lymph nodes at neck.     Neurological/Psychiatric Orientation to person, place, and time: Normal.     Mood and affect: Normal.      Extremities: No clubbing.     Skin: No significant skin lesions were noted at face or neck      Procedure    JAN HALLPIKE TEST AND EPLEY MANEUVER    Patient was placed on examination table, head was rotated towards left side ~45 degrees, and laid back. Head was extended. Dizziness with nystagmus was observed. After dizziness nystagmus has stopped, patient's head was rotated ~90 degrees towards right side, and then further rotated ~90 degrees to right. Then, keeping the head stable patient was sat up. Maneuver was completed.       Last Recorded Vitals  There were no vitals taken for this visit.    Relevant Results  Prior to Admission medications    Medication Sig Start Date End Date Taking? Authorizing Provider   albuterol 90 mcg/actuation inhaler INHALE 2 PUFFS VIA SPACER DEVICE EVERY 4 TO 6 HOURS AS NEEDED.  INHALE ONE PUFF THEN TAKE 5 - 6 EASY BREATHS  REST ONE MINUTE THEN REPEAT.    Historical Provider, MD    citalopram (CeleXA) 20 mg tablet once every 24 hours.    Historical Provider, MD   estradiol (Vagifem) 10 mcg tablet vaginal tablet Insert into the vagina. 10/10/19   Historical Provider, MD   fluticasone (Flonase) 50 mcg/actuation nasal spray Administer into affected nostril(s). 9/2/20   Historical Provider, MD   methocarbamol (Robaxin) 500 mg tablet Take 1 tablet (500 mg) by mouth 4 times a day as needed for muscle spasms. 12/13/23 1/12/24  Stephanie Shelley MD   montelukast (Singulair) 10 mg tablet Take 1 tablet (10 mg) by mouth once daily at bedtime.    Historical Provider, MD   multivitamin tablet once every 24 hours.    Historical Provider, MD   omeprazole (PriLOSEC) 40 mg DR capsule Take 1 capsule (40 mg) by mouth 2 times a day. 12/18/22   Historical Provider, MD   propranolol LA (Inderal LA) 60 mg 24 hr capsule Take 1 capsule (60 mg) by mouth once daily. 6/1/23   Stephanie Shelley MD   sucralfate (Carafate) 1 gram tablet TAKE ONE TABLET BY MOUTH FOUR TIMES A DAY  before meals and at bedtime 10/14/22   Historical Provider, MD   timolol (Timoptic) 0.5 % ophthalmic solution INSTILL ONE DROP EVERY DAY IN THE MORNING. INTO EACH EYE 4/30/23   Historical Provider, MD   tiZANidine (Zanaflex) 4 mg tablet Take 1 tablet (4 mg) by mouth every 8 hours if needed for muscle spasms for up to 10 days. 9/27/23 10/7/23  Stephanie Shelley MD     No results found.      Assessment/Plan   Lucia Shields is a 62 y.o. female presenting with poor hearing. She is kindly referred by Dr. Stephanie Shelley. About 7 years ago she woke up with loud tinnitus and hearing loss. She has bilateral hearing loss, worse in her right ear.     She recently has dizziness. She had a CT scan on 11.05.2023. It was essentially normal.   She feels dizzy when she lays back.  She has glaucoma. Has retinal disease. Using eye drops. Eye drops make her dizzy too.  She has thyroid disease, uses propranolol.    On examination, TMs look intact.  Nasal septum  deviated to left  No postnasal discharge  No palpable neck mass.    [place]-Hallpike maneuver has shown BPPV.  Left Epley maneuver was performed.     Plan:  1- hearing test  2- follow up after the test  3- BPPV recommendations    Romain Godfrey  Otolaryngology - Head & Neck Surgery

## 2023-12-30 ENCOUNTER — HOSPITAL ENCOUNTER (EMERGENCY)
Facility: HOSPITAL | Age: 63
Discharge: HOME | End: 2023-12-30
Attending: EMERGENCY MEDICINE
Payer: COMMERCIAL

## 2023-12-30 ENCOUNTER — APPOINTMENT (OUTPATIENT)
Dept: CARDIOLOGY | Facility: HOSPITAL | Age: 63
End: 2023-12-30
Payer: COMMERCIAL

## 2023-12-30 ENCOUNTER — APPOINTMENT (OUTPATIENT)
Dept: RADIOLOGY | Facility: HOSPITAL | Age: 63
End: 2023-12-30
Payer: COMMERCIAL

## 2023-12-30 VITALS
WEIGHT: 224.87 LBS | RESPIRATION RATE: 18 BRPM | TEMPERATURE: 98.6 F | SYSTOLIC BLOOD PRESSURE: 124 MMHG | BODY MASS INDEX: 37.47 KG/M2 | OXYGEN SATURATION: 98 % | HEART RATE: 75 BPM | DIASTOLIC BLOOD PRESSURE: 77 MMHG | HEIGHT: 65 IN

## 2023-12-30 DIAGNOSIS — R00.2 PALPITATIONS: Primary | ICD-10-CM

## 2023-12-30 LAB
ANION GAP SERPL CALC-SCNC: 10 MMOL/L
BASOPHILS # BLD AUTO: 0.02 X10*3/UL (ref 0–0.1)
BASOPHILS NFR BLD AUTO: 0.4 %
BUN SERPL-MCNC: 12 MG/DL (ref 8–25)
CALCIUM SERPL-MCNC: 9.2 MG/DL (ref 8.5–10.4)
CHLORIDE SERPL-SCNC: 104 MMOL/L (ref 97–107)
CO2 SERPL-SCNC: 28 MMOL/L (ref 24–31)
CREAT SERPL-MCNC: 0.6 MG/DL (ref 0.4–1.6)
EOSINOPHIL # BLD AUTO: 0.15 X10*3/UL (ref 0–0.7)
EOSINOPHIL NFR BLD AUTO: 2.7 %
ERYTHROCYTE [DISTWIDTH] IN BLOOD BY AUTOMATED COUNT: 13.2 % (ref 11.5–14.5)
GFR SERPL CREATININE-BSD FRML MDRD: >90 ML/MIN/1.73M*2
GLUCOSE SERPL-MCNC: 79 MG/DL (ref 65–99)
HCT VFR BLD AUTO: 41 % (ref 36–46)
HGB BLD-MCNC: 13.2 G/DL (ref 12–16)
IMM GRANULOCYTES # BLD AUTO: 0.03 X10*3/UL (ref 0–0.7)
IMM GRANULOCYTES NFR BLD AUTO: 0.5 % (ref 0–0.9)
LYMPHOCYTES # BLD AUTO: 1.38 X10*3/UL (ref 1.2–4.8)
LYMPHOCYTES NFR BLD AUTO: 24.4 %
MAGNESIUM SERPL-MCNC: 2 MG/DL (ref 1.6–3.1)
MCH RBC QN AUTO: 28 PG (ref 26–34)
MCHC RBC AUTO-ENTMCNC: 32.2 G/DL (ref 32–36)
MCV RBC AUTO: 87 FL (ref 80–100)
MONOCYTES # BLD AUTO: 0.35 X10*3/UL (ref 0.1–1)
MONOCYTES NFR BLD AUTO: 6.2 %
NEUTROPHILS # BLD AUTO: 3.73 X10*3/UL (ref 1.2–7.7)
NEUTROPHILS NFR BLD AUTO: 65.8 %
NRBC BLD-RTO: 0 /100 WBCS (ref 0–0)
PLATELET # BLD AUTO: 256 X10*3/UL (ref 150–450)
POTASSIUM SERPL-SCNC: 4.5 MMOL/L (ref 3.4–5.1)
RBC # BLD AUTO: 4.72 X10*6/UL (ref 4–5.2)
SODIUM SERPL-SCNC: 142 MMOL/L (ref 133–145)
TSH SERPL DL<=0.05 MIU/L-ACNC: 0.66 MIU/L (ref 0.27–4.2)
WBC # BLD AUTO: 5.7 X10*3/UL (ref 4.4–11.3)

## 2023-12-30 PROCEDURE — 93005 ELECTROCARDIOGRAM TRACING: CPT

## 2023-12-30 PROCEDURE — 71046 X-RAY EXAM CHEST 2 VIEWS: CPT

## 2023-12-30 PROCEDURE — 83735 ASSAY OF MAGNESIUM: CPT | Performed by: EMERGENCY MEDICINE

## 2023-12-30 PROCEDURE — 99283 EMERGENCY DEPT VISIT LOW MDM: CPT | Mod: 25

## 2023-12-30 PROCEDURE — 36415 COLL VENOUS BLD VENIPUNCTURE: CPT | Performed by: EMERGENCY MEDICINE

## 2023-12-30 PROCEDURE — 84443 ASSAY THYROID STIM HORMONE: CPT | Performed by: EMERGENCY MEDICINE

## 2023-12-30 PROCEDURE — 2500000004 HC RX 250 GENERAL PHARMACY W/ HCPCS (ALT 636 FOR OP/ED): Performed by: EMERGENCY MEDICINE

## 2023-12-30 PROCEDURE — 85025 COMPLETE CBC W/AUTO DIFF WBC: CPT | Performed by: EMERGENCY MEDICINE

## 2023-12-30 PROCEDURE — 99284 EMERGENCY DEPT VISIT MOD MDM: CPT | Performed by: EMERGENCY MEDICINE

## 2023-12-30 PROCEDURE — 80048 BASIC METABOLIC PNL TOTAL CA: CPT | Performed by: EMERGENCY MEDICINE

## 2023-12-30 PROCEDURE — 96360 HYDRATION IV INFUSION INIT: CPT

## 2023-12-30 RX ADMIN — SODIUM CHLORIDE 500 ML: 900 INJECTION, SOLUTION INTRAVENOUS at 11:13

## 2023-12-30 ASSESSMENT — COLUMBIA-SUICIDE SEVERITY RATING SCALE - C-SSRS
2. HAVE YOU ACTUALLY HAD ANY THOUGHTS OF KILLING YOURSELF?: NO
6. HAVE YOU EVER DONE ANYTHING, STARTED TO DO ANYTHING, OR PREPARED TO DO ANYTHING TO END YOUR LIFE?: NO
1. IN THE PAST MONTH, HAVE YOU WISHED YOU WERE DEAD OR WISHED YOU COULD GO TO SLEEP AND NOT WAKE UP?: NO

## 2023-12-30 ASSESSMENT — PAIN - FUNCTIONAL ASSESSMENT: PAIN_FUNCTIONAL_ASSESSMENT: 0-10

## 2023-12-30 NOTE — DISCHARGE INSTRUCTIONS
The cause of your heart palpitations are uncertain at this time.  No abnormal heart rhythm or other finding on evaluation today, however sometimes abnormal heart rhythms can be missed during short period of observatio and require further evaluation with Holter or Zio Patch monitoring which can be performed in your family physician or cardiologist office.  Please follow-up at the number indicated below for further evaluation.  Return immediately to any emergency departement if palpitations associated with chest pain or shortness of breath or passing out.

## 2023-12-30 NOTE — Clinical Note
Lucia Shields was seen and treated in our emergency department on 12/30/2023.  She may return to work on 01/01/2024.       If you have any questions or concerns, please don't hesitate to call.      Kaur Soto MD

## 2023-12-30 NOTE — ED PROVIDER NOTES
HPI   Chief Complaint   Patient presents with    Palpitations     Pt states has had feeling of heart pounding the past few days       63-year-old female presents for chief complaint of palpitations intermittent for the past few days.  She does admit to history of anxiety states that her doctor gave her Ativan which she took last night did not seem to help so she was concerned there could be a problem with her heart.  No chest pain or shortness of breath.  Does admit that she had a death in her family within the past few days and this may be causing her increased rest.                          [place] Coma Scale Score: 15                  Patient History   Past Medical History:   Diagnosis Date    Abnormal MRI of the head 2023    COVID-19 2023    Diffuse pain in left lower extremity 2023    Ganglion of left wrist 2023    Headache disorder 2023    Multiple sites, insect bite, nonvenomous 2023    Nasal congestion with rhinorrhea 2023    Neck muscle spasm 2023    Personal history of other endocrine, nutritional and metabolic disease 2015    History of hyperparathyroidism    Personal history of other infectious and parasitic diseases 2015    History of hepatitis    Personal history of urinary calculi 2015    History of renal calculi     Past Surgical History:   Procedure Laterality Date     SECTION, CLASSIC  2015     Section    DILATION AND CURETTAGE OF UTERUS  2015    Dilation And Curettage    LITHOTRIPSY  2015    Renal Lithotripsy    OTHER SURGICAL HISTORY  2015    Parathyroid Resection     No family history on file.  Social History     Tobacco Use    Smoking status: Never    Smokeless tobacco: Never   Substance Use Topics    Alcohol use: Not on file    Drug use: Not on file       Physical Exam   ED Triage Vitals [23 1038]   Temp Heart Rate Resp BP   37 °C (98.6 °F) 81 18 125/76      SpO2 Temp Source Heart  Rate Source Patient Position   98 % Oral Monitor Sitting      BP Location FiO2 (%)     Right arm --       Physical Exam  Vitals and nursing note reviewed.     Constitutional:  Awake, alert, well appearing, nontoxic  HEENT:  Normocephalic, atraumatic  Neck: Trachea midline, no stridor, no thyromegaly  Respiratory/Chest:  Clear to auscultation bilaterally, no wheezes, rhonchi, or rales  CV:  Regular rate and regular rhythm, no murmurs, gallops, or rubs  Neuro: A/O, normal speech  Skin:  Warm and dry    ED Course & MDM   ED Course as of 12/30/23 1256   Sat Dec 30, 2023   1101 EKG on my independent review: Normal sinus rhythm 80 bpm, left axis deviation, normal intervals, no acute ST or T wave abnormalities [MIRIAN]      ED Course User Index  [MIRIAN] Kaur Soto MD         Diagnoses as of 12/30/23 1256   Palpitations       Medical Decision Making  63-year-old female presents for palpitations in the absence of other symptoms in the setting of recent death in the family and known anxiety but Ativan not helping overnight.  Consider arrhythmia, electrolyte imbalance, thyroid abnormality among others.  No chest pain to suggest ACS.  EKG without arrhythmia, WPW or Brugada.  Labs without significant abnormality.  Chest x-ray on my independent review with no acute cardiopulmonary process.  Etiology of patient's symptoms unclear but she remained stable throughout ED stay.  Consider may be component of anxiety or occult arrhythmia not identifiable here although low suspicion for malignant arrhythmia.  Appropriate for outpatient PCP follow-up.  Advise she may continue her Ativan as needed.  Return precautions discussed.    Amount and/or Complexity of Data Reviewed  Labs: ordered. Decision-making details documented in ED Course.  Radiology: ordered and independent interpretation performed. Decision-making details documented in ED Course.  ECG/medicine tests: ordered and independent interpretation performed. Decision-making details  documented in ED Course.        Procedure  Procedures     Kaur Soto MD  12/30/23 1257

## 2024-01-03 LAB
ATRIAL RATE: 80 BPM
P AXIS: 30 DEGREES
P OFFSET: 179 MS
P ONSET: 127 MS
PR INTERVAL: 182 MS
Q ONSET: 218 MS
QRS COUNT: 13 BEATS
QRS DURATION: 88 MS
QT INTERVAL: 360 MS
QTC CALCULATION(BAZETT): 415 MS
QTC FREDERICIA: 396 MS
R AXIS: -51 DEGREES
T AXIS: 24 DEGREES
T OFFSET: 398 MS
VENTRICULAR RATE: 80 BPM

## 2024-01-24 ENCOUNTER — APPOINTMENT (OUTPATIENT)
Dept: OTOLARYNGOLOGY | Facility: CLINIC | Age: 64
End: 2024-01-24
Payer: COMMERCIAL

## 2024-01-24 ENCOUNTER — APPOINTMENT (OUTPATIENT)
Dept: AUDIOLOGY | Facility: CLINIC | Age: 64
End: 2024-01-24
Payer: COMMERCIAL

## 2024-02-07 ENCOUNTER — CLINICAL SUPPORT (OUTPATIENT)
Dept: AUDIOLOGY | Facility: CLINIC | Age: 64
End: 2024-02-07
Payer: COMMERCIAL

## 2024-02-07 ENCOUNTER — OFFICE VISIT (OUTPATIENT)
Dept: OTOLARYNGOLOGY | Facility: CLINIC | Age: 64
End: 2024-02-07
Payer: COMMERCIAL

## 2024-02-07 DIAGNOSIS — H90.3 SENSORINEURAL HEARING LOSS (SNHL) OF BOTH EARS: ICD-10-CM

## 2024-02-07 DIAGNOSIS — H90.3 ASYMMETRICAL SENSORINEURAL HEARING LOSS: ICD-10-CM

## 2024-02-07 DIAGNOSIS — H93.13 TINNITUS OF BOTH EARS: ICD-10-CM

## 2024-02-07 DIAGNOSIS — H91.93 DECREASED HEARING OF BOTH EARS: Primary | ICD-10-CM

## 2024-02-07 DIAGNOSIS — R42 DIZZINESS: Primary | ICD-10-CM

## 2024-02-07 PROCEDURE — 99213 OFFICE O/P EST LOW 20 MIN: CPT | Performed by: OTOLARYNGOLOGY

## 2024-02-07 PROCEDURE — 92550 TYMPANOMETRY & REFLEX THRESH: CPT | Performed by: AUDIOLOGIST

## 2024-02-07 PROCEDURE — 92557 COMPREHENSIVE HEARING TEST: CPT | Performed by: AUDIOLOGIST

## 2024-02-07 PROCEDURE — 1036F TOBACCO NON-USER: CPT | Performed by: OTOLARYNGOLOGY

## 2024-02-07 NOTE — PROGRESS NOTES
History Of Present Illness     2024: BPPV is gone.  Hearing test shows bilateral presbyacusis, slightly worse in right ear.    Recommendations:  1- Consider hearing aids  2- Consider lenire for tinnitus  3- consider BMD, since BPPV could be more prevalent in women with osteopenia or osteoporosis  _________________________________________________________________    Lucia MELLISA Shields is a 63 y.o. female presenting with poor hearing. She is kindly referred by Dr. Stephanie Shelley. About 7 years ago she woke up with loud tinnitus and hearing loss. She has bilateral hearing loss, worse in her right ear.     She recently has dizziness. She had a CT scan on 2023. It was essentially normal.   She feels dizzy, when she lays back.  She has glaucoma. Has retinal disease. Using eye drops. Eye drops make her dizzy too.  She has thyroid disease, uses propranolol.    On examination, TMs look intact.  Nasal septum deviated to left  No postnasal discharge  No palpable neck mass.    John-Hallpike maneuver has shown BPPV.  Left Epley maneuver was performed.     Plan:  1- hearing test  2- follow up after the test  3- BPPV recommendations    Past Medical History  She has a past medical history of Abnormal MRI of the head (2023), COVID-19 (2023), Diffuse pain in left lower extremity (2023), Ganglion of left wrist (2023), Headache disorder (2023), Multiple sites, insect bite, nonvenomous (2023), Nasal congestion with rhinorrhea (2023), Neck muscle spasm (2023), Personal history of other endocrine, nutritional and metabolic disease (2015), Personal history of other infectious and parasitic diseases (2015), and Personal history of urinary calculi (2015).    Surgical History  She has a past surgical history that includes  section, classic (2015); Other surgical history (2015); Lithotripsy (2015); and Dilation and curettage of uterus  (11/25/2015).     Social History  She reports that she has never smoked. She has never used smokeless tobacco. No history on file for alcohol use and drug use.    Family History  No family history on file.     Allergies  Pollen extracts    Review of Systems (initial ROS)  Difficulty hearing  Vertigo  Tinnitus  Palpitations       Physical Exam (old exam)    General appearance: Healthy-appearing, well-nourished, well groomed, in no acute distress.     Head and Face: Atraumatic with no masses, lesions, or scarring.      Salivary glands: No tenderness of the parotid glands or parotid masses.     No tenderness of the submandibular glands or submandibular masses.      Facial strength: Normal strength and symmetry, no synkinesis or facial tic.     Eyes: Conjunctivas look non-hyperemic bilaterally    Ears: Bilaterally ear canals look normal. Tympanic membranes look intact, no hyperemia, fluid or retraction. Hearing grossly normal.      Nose: Mucosa looks normal. No purulent discharge. Septum deviated to left.     Oral Cavity/Mouth: Lips and tongue look normal.     Throat: No postnasal discharge. No tonsil hypertrophy. No hyperemia.    Neck: Symmetrical, trachea midline.     Pulmonary: Normal respiratory effort.     Lymphatic: No palpable pathologic lymph nodes at neck.     Neurological/Psychiatric Orientation to person, place, and time: Normal.     Mood and affect: Normal.      Extremities: No clubbing.     Skin: No significant skin lesions were noted at face or neck      Procedure    JAN HALLPIKE TEST AND EPLEY MANEUVER    Patient was placed on examination table, head was rotated towards left side ~45 degrees, and laid back. Head was extended. Dizziness with nystagmus was observed. After dizziness nystagmus has stopped, patient's head was rotated ~90 degrees towards right side, and then further rotated ~90 degrees to right. Then, keeping the head stable patient was sat up. Maneuver was completed.       Last Recorded  Vitals  There were no vitals taken for this visit.    Relevant Results  Prior to Admission medications    Medication Sig Start Date End Date Taking? Authorizing Provider   albuterol 90 mcg/actuation inhaler INHALE 2 PUFFS VIA SPACER DEVICE EVERY 4 TO 6 HOURS AS NEEDED.  INHALE ONE PUFF THEN TAKE 5 - 6 EASY BREATHS  REST ONE MINUTE THEN REPEAT.    Historical Provider, MD   citalopram (CeleXA) 20 mg tablet once every 24 hours.    Historical Provider, MD   estradiol (Vagifem) 10 mcg tablet vaginal tablet Insert into the vagina. 10/10/19   Historical Provider, MD   fluticasone (Flonase) 50 mcg/actuation nasal spray Administer into affected nostril(s). 9/2/20   Historical Provider, MD   methocarbamol (Robaxin) 500 mg tablet Take 1 tablet (500 mg) by mouth 4 times a day as needed for muscle spasms. 12/13/23 1/12/24  Stephanie Shelley MD   montelukast (Singulair) 10 mg tablet Take 1 tablet (10 mg) by mouth once daily at bedtime.    Historical Provider, MD   multivitamin tablet once every 24 hours.    Historical Provider, MD   omeprazole (PriLOSEC) 40 mg DR capsule Take 1 capsule (40 mg) by mouth 2 times a day. 12/18/22   Historical Provider, MD   propranolol LA (Inderal LA) 60 mg 24 hr capsule Take 1 capsule (60 mg) by mouth once daily. 6/1/23   Stephanie Shelley MD   sucralfate (Carafate) 1 gram tablet TAKE ONE TABLET BY MOUTH FOUR TIMES A DAY  before meals and at bedtime 10/14/22   Historical Provider, MD   timolol (Timoptic) 0.5 % ophthalmic solution INSTILL ONE DROP EVERY DAY IN THE MORNING. INTO EACH EYE 4/30/23   Historical Provider, MD   tiZANidine (Zanaflex) 4 mg tablet Take 1 tablet (4 mg) by mouth every 8 hours if needed for muscle spasms for up to 10 days. 9/27/23 10/7/23  Stephanie Shelley MD     No results found.      Assessment/Plan      02.07.2024: BPPV is gone.  Hearing test shows bilateral presbyacusis, slightly worse in right ear.    Recommendations:  1- Consider hearing aids  2- Consider lenire for  tinnitus  3- consider BMD, since BPPV could be more prevalent in women with osteopenia or osteoporosis  _________________________________________________________________    Lucia Shields is a 62 y.o. female presenting with poor hearing. She is kindly referred by Dr. Stephanie Shelley. About 7 years ago she woke up with loud tinnitus and hearing loss. She has bilateral hearing loss, worse in her right ear.     She recently has dizziness. She had a CT scan on 11.05.2023. It was essentially normal.   She feels dizzy when she lays back.  She has glaucoma. Has retinal disease. Using eye drops. Eye drops make her dizzy too.  She has thyroid disease, uses propranolol.    On examination, TMs look intact.  Nasal septum deviated to left  No postnasal discharge  No palpable neck mass.    [place]-Hallpike maneuver has shown BPPV.  Left Epley maneuver was performed.     Plan:  1- hearing test  2- follow up after the test  3- BPPV recommendations    Romain Godfrey  Otolaryngology - Head & Neck Surgery

## 2024-02-07 NOTE — PROGRESS NOTES
AUDIOLOGY ADULT AUDIOMETRIC EVALUATION    Name:  Lucia Shields  :  1960  Age:  63 y.o.  Date of Evaluation:  2024    Reason for visit: Ms. Shields is seen in the clinic today at the request of otolaryngology for an audiologic evaluation.     HISTORY  Very patient complains of dizzy, and here for a follow up on that with Dr. Godfrey, tinnitus and hearing loss.  She denies fulness and is having difficulty hearing at work and at home.    EVALUATION  See scanned audiogram: “Media” > “Audiology Report”.      RESULTS  Otoscopic Evaluation:  Right Ear: clear ear canal  Left Ear: clear ear canal    Immittance Measures:  Tympanometry:  Right Ear: Type A, normal tympanic membrane mobility with normal middle ear pressure   Left Ear: Type A, normal tympanic membrane mobility with normal middle ear pressure     Acoustic Reflexes:  Ipsilateral Right Ear:   Ipsilateral Left Ear:   Contralateral Right Ear: did not evaluate  Contralateral Left Ear: did not evaluate    Distortion Product Otoacoustic Emissions (DPOAEs):  Right Ear: refer: 1-4k  Left Ear:   refer:  1-4 k    Audiometry:  Test Technique and Reliability: BEHAVIORAL   Standard audiometry via supra-aural headphones. Reliability is good.    Pure tone air and bone conduction audiometry:  Right Ear: WNL  HZ WITH A MILD MODERATE TO SEVERE SNHL .ASYMMETRIC HEARING LOSS AT 1-8 K HZ WORSE THAN LEFT.  Left Ear: WNL  HZ WITH A MILDMODERATE TO SEVERE SNHL.    Speech Audiometry (Word Recognition Scores):   Right Ear: 80% GOOD  Left Ear:   88% GOOD    IMPRESSIONS  BILATERAL ASYMMETRIC SNHL.    The presence of acoustic reflexes within normal intensity limits is consistent with normal middle ear and brainstem function, and suggests that auditory sensitivity is not significantly impaired. An elevated or absent acoustic reflex threshold is consistent with a middle ear disorder, hearing loss in the stimulated ear, and/or interruption of neural  innervation of the stapedius muscle. Present DPOAEs suggest normal/near normal cochlear outer hair cell function and are consistent with no greater than a mild hearing loss at those frequencies. Absent DPOAEs are consistent with abnormal cochlear outer hair cell function and some degree of hearing loss at those frequencies.    RECOMMENDATIONS  - Follow up with otolaryngology today as scheduled.  - Audiologic evaluation as needed.  - Annual audiologic evaluation, sooner if an acute change is noted.  - Audiologic evaluation in conjunction with otologic care, if an acute change is noted, and/or annually.  - Consider hearing aids.  DISCUSSED. Contact insurance to determine if there is an applicable benefit and where it can be used. Contact our office to schedule an appointment should she wish to proceed with hearing aids through our clinic.  - Consider hearing aids. Contact insurance to determine if there is an applicable benefit and where it can be used.  -FOLLOW WITH ENT FOR FURTHER TESTING AS INDICATED FOR DIZZY, TINNITUS AND AYMMETRY.   Follow-up with audiology annually for routine hearing aid maintenance, sooner if questions/problems arise.  - Follow-up with medical care team as planned.    PATIENT EDUCATION  Discussed results, impressions and recommendations with the patient. Questions were addressed and the patient was encouraged to contact our office should concerns arise.    Time for this encounter:    Joel Esquivel  Licensed Audiologist

## 2024-03-07 ENCOUNTER — TELEPHONE (OUTPATIENT)
Dept: PRIMARY CARE | Facility: CLINIC | Age: 64
End: 2024-03-07
Payer: COMMERCIAL

## 2024-03-07 DIAGNOSIS — J45.901 EXACERBATION OF ASTHMA, UNSPECIFIED ASTHMA SEVERITY, UNSPECIFIED WHETHER PERSISTENT (HHS-HCC): ICD-10-CM

## 2024-03-08 RX ORDER — ALBUTEROL SULFATE 90 UG/1
AEROSOL, METERED RESPIRATORY (INHALATION)
Qty: 18 G | Refills: 1 | Status: SHIPPED | OUTPATIENT
Start: 2024-03-08

## 2024-03-21 ENCOUNTER — OFFICE VISIT (OUTPATIENT)
Dept: PRIMARY CARE | Facility: CLINIC | Age: 64
End: 2024-03-21
Payer: COMMERCIAL

## 2024-03-21 VITALS
SYSTOLIC BLOOD PRESSURE: 120 MMHG | OXYGEN SATURATION: 97 % | RESPIRATION RATE: 16 BRPM | DIASTOLIC BLOOD PRESSURE: 68 MMHG | BODY MASS INDEX: 36.11 KG/M2 | TEMPERATURE: 98 F | WEIGHT: 217 LBS | HEART RATE: 68 BPM

## 2024-03-21 DIAGNOSIS — K21.9 GASTROESOPHAGEAL REFLUX DISEASE WITHOUT ESOPHAGITIS: ICD-10-CM

## 2024-03-21 DIAGNOSIS — T78.40XA ALLERGIC REACTION, INITIAL ENCOUNTER: Primary | ICD-10-CM

## 2024-03-21 PROCEDURE — 1036F TOBACCO NON-USER: CPT | Performed by: INTERNAL MEDICINE

## 2024-03-21 PROCEDURE — 99213 OFFICE O/P EST LOW 20 MIN: CPT | Performed by: INTERNAL MEDICINE

## 2024-03-21 RX ORDER — OMEPRAZOLE 40 MG/1
40 CAPSULE, DELAYED RELEASE ORAL 2 TIMES DAILY
Qty: 20 CAPSULE | Refills: 0 | Status: SHIPPED | OUTPATIENT
Start: 2024-03-21 | End: 2024-03-31

## 2024-03-21 ASSESSMENT — ENCOUNTER SYMPTOMS
COUGH: 0
ARTHRALGIAS: 0
SEIZURES: 0
CHEST TIGHTNESS: 0
WHEEZING: 0
SPEECH DIFFICULTY: 0
VOMITING: 0
NECK PAIN: 0
HALLUCINATIONS: 0
NAUSEA: 0
EYE PAIN: 0
PALPITATIONS: 0
FEVER: 0
ABDOMINAL PAIN: 0
SINUS PAIN: 0
WOUND: 0
BLOOD IN STOOL: 0
FACIAL ASYMMETRY: 0
SLEEP DISTURBANCE: 0
HEMATURIA: 0
NUMBNESS: 0
SHORTNESS OF BREATH: 0
VOICE CHANGE: 0
DIARRHEA: 0
STRIDOR: 0
MYALGIAS: 0
JOINT SWELLING: 0
APPETITE CHANGE: 0
FATIGUE: 1
CONFUSION: 0
WEAKNESS: 1
UNEXPECTED WEIGHT CHANGE: 0
FLANK PAIN: 0
FACIAL SWELLING: 1
POLYDIPSIA: 0
BACK PAIN: 0
DYSURIA: 0
HEADACHES: 0
COLOR CHANGE: 1
ADENOPATHY: 0
TREMORS: 0
POLYPHAGIA: 0
PHOTOPHOBIA: 0
TROUBLE SWALLOWING: 0
SORE THROAT: 0
CONSTIPATION: 0
ACTIVITY CHANGE: 0
NERVOUS/ANXIOUS: 0
DIZZINESS: 0

## 2024-03-21 NOTE — PROGRESS NOTES
Subjective   Patient ID: Lucia Shields is a 63 y.o. female who presents for Hospital Follow-up (Allergic reaction).    HPI   Patient last week was in [place] for helping a friend. They were eating lots of chinese food and one morning she work up with body rash, swelling of the face and tongue. She went to ER and was given prednisone and IV medication. Her symptoms are better but she still has funny feeling on the tongue and lots of acid reflux.    Review of Systems   Constitutional:  Positive for fatigue. Negative for activity change, appetite change, fever and unexpected weight change.   HENT:  Positive for congestion, facial swelling and sneezing. Negative for dental problem, ear discharge, hearing loss, nosebleeds, postnasal drip, sinus pain, sore throat, trouble swallowing and voice change.    Eyes:  Negative for photophobia, pain and visual disturbance.   Respiratory:  Negative for cough, chest tightness, shortness of breath, wheezing and stridor.    Cardiovascular:  Negative for chest pain, palpitations and leg swelling.   Gastrointestinal:  Negative for abdominal pain, blood in stool, constipation, diarrhea, nausea and vomiting.   Endocrine: Negative for polydipsia, polyphagia and polyuria.   Genitourinary:  Negative for decreased urine volume, dyspareunia, dysuria, flank pain, hematuria and urgency.   Musculoskeletal:  Negative for arthralgias, back pain, gait problem, joint swelling, myalgias and neck pain.   Skin:  Positive for color change and rash. Negative for wound.   Allergic/Immunologic: Negative for environmental allergies and food allergies.   Neurological:  Positive for weakness. Negative for dizziness, tremors, seizures, syncope, facial asymmetry, speech difficulty, numbness and headaches.   Hematological:  Negative for adenopathy.   Psychiatric/Behavioral:  Negative for behavioral problems, confusion, hallucinations, self-injury, sleep disturbance and suicidal ideas. The patient is not  nervous/anxious.      Objective   /68 (BP Location: Left arm, Patient Position: Sitting, BP Cuff Size: Adult)   Pulse 68   Temp 36.7 °C (98 °F) (Temporal)   Resp 16   Wt 98.4 kg (217 lb)   SpO2 97%   BMI 36.11 kg/m²     Physical Exam  Constitutional:       General: She is not in acute distress.     Appearance: She is obese. She is not ill-appearing or toxic-appearing.   HENT:      Head: Normocephalic.      Nose: Nose normal. No congestion.   Eyes:      General:         Right eye: No discharge.         Left eye: No discharge.      Conjunctiva/sclera: Conjunctivae normal.   Cardiovascular:      Pulses: Normal pulses.      Heart sounds: Normal heart sounds. No murmur heard.  Abdominal:      General: Bowel sounds are normal.   Musculoskeletal:         General: No swelling or deformity. Normal range of motion.      Cervical back: Normal range of motion. No rigidity or tenderness.   Lymphadenopathy:      Cervical: No cervical adenopathy.   Skin:     General: Skin is warm.      Coloration: Skin is not jaundiced.      Findings: No bruising, erythema or rash.   Neurological:      General: No focal deficit present.      Mental Status: She is alert and oriented to person, place, and time.   Psychiatric:         Mood and Affect: Mood normal.         Behavior: Behavior normal.     \  Assessment/Plan   Problem List Items Addressed This Visit          Allergies and Adverse Reactions    Allergic reaction - Primary     Physical exam is normal today. Patient has been reassured.          Other Visit Diagnoses       Gastroesophageal reflux disease without esophagitis        Relevant Medications    omeprazole (PriLOSEC) 40 mg DR capsule

## 2024-05-20 ENCOUNTER — HOSPITAL ENCOUNTER (OUTPATIENT)
Dept: RADIOLOGY | Facility: HOSPITAL | Age: 64
Discharge: HOME | End: 2024-05-20
Payer: COMMERCIAL

## 2024-05-20 ENCOUNTER — OFFICE VISIT (OUTPATIENT)
Dept: PRIMARY CARE | Facility: CLINIC | Age: 64
End: 2024-05-20
Payer: COMMERCIAL

## 2024-05-20 VITALS
HEIGHT: 65 IN | WEIGHT: 224 LBS | HEART RATE: 78 BPM | TEMPERATURE: 97.8 F | BODY MASS INDEX: 37.32 KG/M2 | OXYGEN SATURATION: 97 % | SYSTOLIC BLOOD PRESSURE: 126 MMHG | DIASTOLIC BLOOD PRESSURE: 82 MMHG

## 2024-05-20 DIAGNOSIS — M79.671 RIGHT FOOT PAIN: Primary | ICD-10-CM

## 2024-05-20 DIAGNOSIS — Z12.31 BREAST CANCER SCREENING BY MAMMOGRAM: ICD-10-CM

## 2024-05-20 DIAGNOSIS — M79.671 RIGHT FOOT PAIN: ICD-10-CM

## 2024-05-20 PROCEDURE — 77067 SCR MAMMO BI INCL CAD: CPT | Performed by: STUDENT IN AN ORGANIZED HEALTH CARE EDUCATION/TRAINING PROGRAM

## 2024-05-20 PROCEDURE — 77067 SCR MAMMO BI INCL CAD: CPT

## 2024-05-20 PROCEDURE — 73630 X-RAY EXAM OF FOOT: CPT | Mod: RIGHT SIDE | Performed by: RADIOLOGY

## 2024-05-20 PROCEDURE — 73630 X-RAY EXAM OF FOOT: CPT | Mod: RT

## 2024-05-20 PROCEDURE — 99212 OFFICE O/P EST SF 10 MIN: CPT | Performed by: INTERNAL MEDICINE

## 2024-05-20 PROCEDURE — 77063 BREAST TOMOSYNTHESIS BI: CPT | Performed by: STUDENT IN AN ORGANIZED HEALTH CARE EDUCATION/TRAINING PROGRAM

## 2024-05-20 ASSESSMENT — ENCOUNTER SYMPTOMS
FEVER: 0
HALLUCINATIONS: 0
NECK PAIN: 0
NERVOUS/ANXIOUS: 0
APPETITE CHANGE: 0
EYE PAIN: 0
DIZZINESS: 0
BACK PAIN: 0
FATIGUE: 0
CONFUSION: 0
NUMBNESS: 0
PHOTOPHOBIA: 0
DYSURIA: 0
COUGH: 0
ARTHRALGIAS: 1
WEAKNESS: 0
SLEEP DISTURBANCE: 0
JOINT SWELLING: 1
NAUSEA: 0
STRIDOR: 0
HEMATURIA: 0
UNEXPECTED WEIGHT CHANGE: 0
SHORTNESS OF BREATH: 0
CHEST TIGHTNESS: 0
WHEEZING: 0
COLOR CHANGE: 0
MYALGIAS: 0
VOMITING: 0
CONSTIPATION: 0
PALPITATIONS: 0
ADENOPATHY: 0
WOUND: 0

## 2024-05-20 NOTE — PROGRESS NOTES
"Subjective   Patient ID: Lucia Shields is a 63 y.o. female who presents for Establish Care (Right foot pain).    HPI   Patient presented for right foot pain especially with walking from past few days. She denied for any trauma. She has to take OTC analgesics.   She is unable to pinpoint exactly but it appears that she is having discomfort at the ankle and dorsum of the foot with walking.  No redness. Mild puffiness is present but its on both the sides.    Review of Systems   Constitutional:  Negative for appetite change, fatigue, fever and unexpected weight change.   HENT:  Negative for dental problem, ear discharge, hearing loss, nosebleeds and postnasal drip.    Eyes:  Negative for photophobia, pain and visual disturbance.   Respiratory:  Negative for cough, chest tightness, shortness of breath, wheezing and stridor.    Cardiovascular:  Negative for chest pain, palpitations and leg swelling.   Gastrointestinal:  Negative for constipation, nausea and vomiting.   Genitourinary:  Negative for dyspareunia, dysuria and hematuria.   Musculoskeletal:  Positive for arthralgias, gait problem and joint swelling. Negative for back pain, myalgias and neck pain.   Skin:  Negative for color change, rash and wound.   Neurological:  Negative for dizziness, syncope, weakness and numbness.   Hematological:  Negative for adenopathy.   Psychiatric/Behavioral:  Negative for behavioral problems, confusion, hallucinations and sleep disturbance. The patient is not nervous/anxious.      Objective   /82   Pulse 78   Temp 36.6 °C (97.8 °F)   Ht 1.651 m (5' 5\")   Wt 102 kg (224 lb)   SpO2 97%   BMI 37.28 kg/m²     Physical Exam  Constitutional:       General: She is not in acute distress.     Appearance: She is obese. She is not ill-appearing or toxic-appearing.   HENT:      Nose: Nose normal. No congestion.   Cardiovascular:      Pulses:           Dorsalis pedis pulses are 2+ on the right side and 2+ on the left side. "   Pulmonary:      Effort: Pulmonary effort is normal.   Abdominal:      Tenderness: There is no guarding or rebound.   Musculoskeletal:         General: Swelling present.      Right foot: Normal range of motion. No deformity, Charcot foot or foot drop.      Left foot: Normal range of motion. No deformity, Charcot foot or foot drop.   Feet:      Right foot:      Skin integrity: Dry skin present. No ulcer, blister, erythema, warmth or fissure.      Toenail Condition: Right toenails are normal.      Left foot:      Skin integrity: Dry skin present. No ulcer, erythema, warmth or fissure.      Toenail Condition: Left toenails are normal.   Neurological:      General: No focal deficit present.      Mental Status: She is alert and oriented to person, place, and time.   Psychiatric:         Mood and Affect: Mood normal.         Behavior: Behavior normal.       Assessment/Plan   Problem List Items Addressed This Visit    None  Visit Diagnoses       Right foot pain    -  Primary    Relevant Orders    XR foot right 3+ views    Breast cancer screening by mammogram        Relevant Orders    BI mammo bilateral screening tomosynthesis          She also want an order for Mammogram.

## 2024-05-22 ENCOUNTER — TELEPHONE (OUTPATIENT)
Dept: PRIMARY CARE | Facility: CLINIC | Age: 64
End: 2024-05-22
Payer: COMMERCIAL

## 2024-05-22 NOTE — TELEPHONE ENCOUNTER
Pt aware, per Dr. Dixon. It appears that you have arthritis in the foot and also plantar fascitis can be a possibility. Please wear shoes with good arch support, you can also buy arch support from any pharmacy, its over the counter. You can follow up with Dr Shelley. Its not a medical emergency. Alternative option is follow up with a foot doctor. States she will follow up with her foot doctor.

## 2024-05-31 ENCOUNTER — TELEPHONE (OUTPATIENT)
Dept: PRIMARY CARE | Facility: CLINIC | Age: 64
End: 2024-05-31
Payer: COMMERCIAL

## 2024-05-31 DIAGNOSIS — R00.2 PALPITATIONS: ICD-10-CM

## 2024-05-31 DIAGNOSIS — J45.901 EXACERBATION OF ASTHMA, UNSPECIFIED ASTHMA SEVERITY, UNSPECIFIED WHETHER PERSISTENT (HHS-HCC): ICD-10-CM

## 2024-05-31 DIAGNOSIS — T78.40XA ALLERGIC REACTION, INITIAL ENCOUNTER: ICD-10-CM

## 2024-05-31 NOTE — TELEPHONE ENCOUNTER
Scheduled pt for physical 6/18/24. Pt is requesting a refill for Singulair 10 mg and propranolol 60 mg sent to [facility] in [place].

## 2024-06-03 RX ORDER — MONTELUKAST SODIUM 10 MG/1
10 TABLET ORAL NIGHTLY
Qty: 90 TABLET | Refills: 3 | Status: SHIPPED | OUTPATIENT
Start: 2024-06-03 | End: 2025-06-03

## 2024-06-03 RX ORDER — PROPRANOLOL HYDROCHLORIDE 60 MG/1
60 CAPSULE, EXTENDED RELEASE ORAL DAILY
Qty: 90 CAPSULE | Refills: 3 | Status: SHIPPED | OUTPATIENT
Start: 2024-06-03

## 2024-06-18 ENCOUNTER — APPOINTMENT (OUTPATIENT)
Dept: PRIMARY CARE | Facility: CLINIC | Age: 64
End: 2024-06-18
Payer: COMMERCIAL

## 2024-06-18 VITALS
BODY MASS INDEX: 36.82 KG/M2 | HEART RATE: 74 BPM | DIASTOLIC BLOOD PRESSURE: 70 MMHG | TEMPERATURE: 98.2 F | HEIGHT: 65 IN | SYSTOLIC BLOOD PRESSURE: 110 MMHG | OXYGEN SATURATION: 96 % | WEIGHT: 221 LBS

## 2024-06-18 DIAGNOSIS — K21.9 GASTROESOPHAGEAL REFLUX DISEASE WITHOUT ESOPHAGITIS: ICD-10-CM

## 2024-06-18 DIAGNOSIS — F41.1 GENERALIZED ANXIETY DISORDER: ICD-10-CM

## 2024-06-18 DIAGNOSIS — Z00.00 ROUTINE GENERAL MEDICAL EXAMINATION AT A HEALTH CARE FACILITY: Primary | ICD-10-CM

## 2024-06-18 DIAGNOSIS — E05.90 HYPERTHYROIDISM: ICD-10-CM

## 2024-06-18 DIAGNOSIS — J45.21 MILD INTERMITTENT ASTHMA WITH EXACERBATION (HHS-HCC): ICD-10-CM

## 2024-06-18 DIAGNOSIS — R25.2 MUSCLE CRAMPS: ICD-10-CM

## 2024-06-18 DIAGNOSIS — H93.13 TINNITUS OF BOTH EARS: ICD-10-CM

## 2024-06-18 DIAGNOSIS — E55.9 VITAMIN D DEFICIENCY: ICD-10-CM

## 2024-06-18 PROBLEM — C71.9 GLIOBLASTOMA (MULTI): Status: RESOLVED | Noted: 2023-09-27 | Resolved: 2024-06-18

## 2024-06-18 PROBLEM — G44.209 TENSION HEADACHE: Status: RESOLVED | Noted: 2023-09-27 | Resolved: 2024-06-18

## 2024-06-18 PROBLEM — L03.119 CELLULITIS OF LOWER EXTREMITY: Status: RESOLVED | Noted: 2023-06-01 | Resolved: 2024-06-18

## 2024-06-18 PROBLEM — R07.89 ATYPICAL CHEST PAIN: Status: RESOLVED | Noted: 2023-06-01 | Resolved: 2024-06-18

## 2024-06-18 PROBLEM — K57.92 DIVERTICULITIS: Status: RESOLVED | Noted: 2023-06-19 | Resolved: 2024-06-18

## 2024-06-18 PROBLEM — R20.2 PARESTHESIA: Status: RESOLVED | Noted: 2023-06-01 | Resolved: 2024-06-18

## 2024-06-18 PROBLEM — M54.50 ACUTE LEFT-SIDED LOW BACK PAIN WITHOUT SCIATICA: Status: RESOLVED | Noted: 2023-09-27 | Resolved: 2024-06-18

## 2024-06-18 PROBLEM — K57.32 DIVERTICULITIS OF LARGE INTESTINE WITHOUT PERFORATION OR ABSCESS WITHOUT BLEEDING: Status: RESOLVED | Noted: 2023-06-19 | Resolved: 2024-06-18

## 2024-06-18 PROBLEM — R79.89 LOW TSH LEVEL: Status: RESOLVED | Noted: 2023-06-01 | Resolved: 2024-06-18

## 2024-06-18 PROCEDURE — 99396 PREV VISIT EST AGE 40-64: CPT | Performed by: FAMILY MEDICINE

## 2024-06-18 PROCEDURE — 99213 OFFICE O/P EST LOW 20 MIN: CPT | Performed by: FAMILY MEDICINE

## 2024-06-18 RX ORDER — LORAZEPAM 0.5 MG/1
0.5 TABLET ORAL 3 TIMES DAILY PRN
Qty: 40 TABLET | Refills: 0 | Status: SHIPPED | OUTPATIENT
Start: 2024-06-18 | End: 2024-09-16

## 2024-06-18 RX ORDER — CALCIUM CARB/MAGNESIUM CARB 311-232MG
TABLET ORAL
COMMUNITY

## 2024-06-18 RX ORDER — METHOCARBAMOL 500 MG/1
500 TABLET, FILM COATED ORAL 4 TIMES DAILY PRN
Qty: 60 TABLET | Refills: 3 | Status: SHIPPED | OUTPATIENT
Start: 2024-06-18 | End: 2024-07-18

## 2024-06-18 RX ORDER — OMEPRAZOLE 40 MG/1
40 CAPSULE, DELAYED RELEASE ORAL
Qty: 90 CAPSULE | Refills: 3 | Status: SHIPPED | OUTPATIENT
Start: 2024-06-18 | End: 2025-06-18

## 2024-06-18 ASSESSMENT — PAIN SCALES - GENERAL: PAINLEVEL: 0-NO PAIN

## 2024-06-18 ASSESSMENT — ENCOUNTER SYMPTOMS
VOMITING: 0
ABDOMINAL PAIN: 0
UNEXPECTED WEIGHT CHANGE: 0
NUMBNESS: 0
DIZZINESS: 0
FATIGUE: 0
FREQUENCY: 0
MYALGIAS: 1
HEADACHES: 1
JOINT SWELLING: 0
BLOOD IN STOOL: 0
HEMATURIA: 0
PALPITATIONS: 0
FEVER: 0
CONFUSION: 0
SORE THROAT: 0
ARTHRALGIAS: 1
HALLUCINATIONS: 0
WEAKNESS: 0
EYE PAIN: 0
NAUSEA: 0
DIARRHEA: 0
SHORTNESS OF BREATH: 0
NECK PAIN: 1
COUGH: 0
DYSURIA: 0
TROUBLE SWALLOWING: 0
DECREASED CONCENTRATION: 0

## 2024-06-18 NOTE — PATIENT INSTRUCTIONS
It was nice to see you today!  Discussed current concerns and addressed   Reviewed recent labs and diagnostics  Reviewed medications list  Continue to eat a healthy diet, exercise at least 3 times a week or more  Plan and follow up discussed  For any further information related to your condition, copy and paste or go to [email or web address]    Referred her to a medical spa for Ozempic or Mounjaro  I think she is having tension headache on the left and encouraged her to get a massage, refill methocarbamol and stretch  Follow-up with ENT for hearing and ringing in the ears  Cut down on calories and increase activity  Recommend restarting citalopram and gave her 40 Ativan to last her for the year.

## 2024-06-18 NOTE — PROGRESS NOTES
Subjective   Patient ID: Lucia Shields is a 63 y.o. female.    Patient comes in for a physical exam but she has a list of other issues to discuss.  First of all her anxiety has come and gone for years after her  .  She was given citalopram but stopped it for unknown reason.  She has Ativan that she got in the [place] while visiting family.  She takes it to flying as needed and thinks that 1 prescription will get her through the year.  She is overweight.  BMI is 36.78.  She is not diabetic as her A1c is around 5%.  We discussed doing Ozempic through a weight loss clinic.  She has hyperthyroidism but no thyroid cancer.  She has chronic tinnitus and has seen ENT.  She also has headaches.  She has a pressure feeling from the inside of her head pushing out on the left side of her head.  She denies it is a headache but cannot really explain more than just pressure.  Neuro no focal neurological deficits. She has a history of asthma and that has been doing well.  Mammogram is current.  She needs refill on reflux medicines.  She is due for labs.  She is getting to the dentist and eye doctor.  Trying to eat a healthy diet but exercise is limited.  Colonoscopy is current.        Review of Systems   Constitutional:  Negative for fatigue, fever and unexpected weight change.   HENT:  Positive for hearing loss and tinnitus. Negative for congestion, ear pain, sore throat and trouble swallowing.    Eyes:  Negative for pain and visual disturbance.   Respiratory:  Negative for cough and shortness of breath.    Cardiovascular:  Negative for chest pain, palpitations and leg swelling.   Gastrointestinal:  Negative for abdominal pain, blood in stool, diarrhea, nausea and vomiting.   Genitourinary:  Negative for dysuria, frequency, hematuria and urgency.   Musculoskeletal:  Positive for arthralgias, myalgias and neck pain. Negative for joint swelling.   Skin:  Negative for pallor and rash.   Neurological:  Positive for  headaches. Negative for dizziness, syncope, weakness and numbness.   Psychiatric/Behavioral:  Negative for confusion, decreased concentration, hallucinations and suicidal ideas.      Vitals:    06/18/24 0902   BP: 110/70   Pulse: 74   Temp: 36.8 °C (98.2 °F)   SpO2: 96%      Objective   Physical Exam  Constitutional:       Appearance: Normal appearance. She is obese.   HENT:      Head: Normocephalic and atraumatic.      Right Ear: Tympanic membrane and external ear normal.      Left Ear: Tympanic membrane and external ear normal.      Nose: Nose normal.      Mouth/Throat:      Mouth: Mucous membranes are moist.      Pharynx: Oropharynx is clear. No oropharyngeal exudate.   Eyes:      Extraocular Movements: Extraocular movements intact.      Conjunctiva/sclera: Conjunctivae normal.      Pupils: Pupils are equal, round, and reactive to light.   Cardiovascular:      Rate and Rhythm: Normal rate and regular rhythm.      Heart sounds: Normal heart sounds.   Pulmonary:      Effort: Pulmonary effort is normal.      Breath sounds: Normal breath sounds.   Abdominal:      General: Abdomen is flat.      Palpations: Abdomen is soft. There is no mass.      Tenderness: There is no abdominal tenderness. There is no guarding.   Musculoskeletal:      Cervical back: Neck supple.   Lymphadenopathy:      Cervical: No cervical adenopathy.   Skin:     General: Skin is warm and dry.   Neurological:      General: No focal deficit present.      Mental Status: She is alert.   Psychiatric:         Mood and Affect: Mood normal.         Speech: Speech normal.         Behavior: Behavior normal.         Cognition and Memory: Cognition normal.         Assessment/Plan   Diagnoses and all orders for this visit:  Routine general medical examination at a health care facility  Mild intermittent asthma with exacerbation ([facility])  -     Lipid Panel; Future  -     Comprehensive Metabolic Panel; Future  -     CBC and Auto Differential; Future  Generalized  anxiety disorder  -     LORazepam (Ativan) 0.5 mg tablet; Take 1 tablet (0.5 mg) by mouth 3 times a day as needed for anxiety.  -     Comprehensive Metabolic Panel; Future  Muscle cramps  -     methocarbamol (Robaxin) 500 mg tablet; Take 1 tablet (500 mg) by mouth 4 times a day as needed for muscle spasms.  Gastroesophageal reflux disease without esophagitis  -     omeprazole (PriLOSEC) 40 mg DR capsule; Take 1 capsule (40 mg) by mouth once daily in the morning. Take before meals.  -     CBC and Auto Differential; Future  Hyperthyroidism  -     Thyroid Stimulating Hormone; Future  -     Urinalysis with Reflex Microscopic; Future  Vitamin D deficiency  -     Vitamin D 25-Hydroxy,Total (for eval of Vitamin D levels); Future  Tinnitus of both ears

## 2024-06-19 ENCOUNTER — LAB (OUTPATIENT)
Dept: LAB | Facility: LAB | Age: 64
End: 2024-06-19
Payer: COMMERCIAL

## 2024-06-19 DIAGNOSIS — F41.1 GENERALIZED ANXIETY DISORDER: ICD-10-CM

## 2024-06-19 DIAGNOSIS — J45.21 MILD INTERMITTENT ASTHMA WITH EXACERBATION (HHS-HCC): ICD-10-CM

## 2024-06-19 DIAGNOSIS — E55.9 VITAMIN D DEFICIENCY: ICD-10-CM

## 2024-06-19 DIAGNOSIS — E05.90 HYPERTHYROIDISM: ICD-10-CM

## 2024-06-19 DIAGNOSIS — K21.9 GASTROESOPHAGEAL REFLUX DISEASE WITHOUT ESOPHAGITIS: ICD-10-CM

## 2024-06-19 LAB
25(OH)D3 SERPL-MCNC: 34 NG/ML (ref 30–100)
ALBUMIN SERPL BCP-MCNC: 4.1 G/DL (ref 3.4–5)
ALP SERPL-CCNC: 61 U/L (ref 33–136)
ALT SERPL W P-5'-P-CCNC: 14 U/L (ref 7–45)
ANION GAP SERPL CALC-SCNC: 13 MMOL/L (ref 10–20)
APPEARANCE UR: CLEAR
AST SERPL W P-5'-P-CCNC: 13 U/L (ref 9–39)
BACTERIA #/AREA URNS AUTO: ABNORMAL /HPF
BASOPHILS # BLD AUTO: 0.02 X10*3/UL (ref 0–0.1)
BASOPHILS NFR BLD AUTO: 0.4 %
BILIRUB SERPL-MCNC: 0.8 MG/DL (ref 0–1.2)
BILIRUB UR STRIP.AUTO-MCNC: NEGATIVE MG/DL
BUN SERPL-MCNC: 15 MG/DL (ref 6–23)
CALCIUM SERPL-MCNC: 9 MG/DL (ref 8.6–10.3)
CHLORIDE SERPL-SCNC: 103 MMOL/L (ref 98–107)
CHOLEST SERPL-MCNC: 231 MG/DL (ref 0–199)
CHOLESTEROL/HDL RATIO: 4.8
CO2 SERPL-SCNC: 27 MMOL/L (ref 21–32)
COLOR UR: ABNORMAL
CREAT SERPL-MCNC: 0.54 MG/DL (ref 0.5–1.05)
EGFRCR SERPLBLD CKD-EPI 2021: >90 ML/MIN/1.73M*2
EOSINOPHIL # BLD AUTO: 0.2 X10*3/UL (ref 0–0.7)
EOSINOPHIL NFR BLD AUTO: 3.6 %
ERYTHROCYTE [DISTWIDTH] IN BLOOD BY AUTOMATED COUNT: 13.6 % (ref 11.5–14.5)
GLUCOSE SERPL-MCNC: 90 MG/DL (ref 74–99)
GLUCOSE UR STRIP.AUTO-MCNC: NORMAL MG/DL
HCT VFR BLD AUTO: 39.2 % (ref 36–46)
HDLC SERPL-MCNC: 48.1 MG/DL
HGB BLD-MCNC: 12.5 G/DL (ref 12–16)
IMM GRANULOCYTES # BLD AUTO: 0.02 X10*3/UL (ref 0–0.7)
IMM GRANULOCYTES NFR BLD AUTO: 0.4 % (ref 0–0.9)
KETONES UR STRIP.AUTO-MCNC: NEGATIVE MG/DL
LDLC SERPL CALC-MCNC: 150 MG/DL
LEUKOCYTE ESTERASE UR QL STRIP.AUTO: ABNORMAL
LYMPHOCYTES # BLD AUTO: 1.65 X10*3/UL (ref 1.2–4.8)
LYMPHOCYTES NFR BLD AUTO: 29.5 %
MCH RBC QN AUTO: 27.7 PG (ref 26–34)
MCHC RBC AUTO-ENTMCNC: 31.9 G/DL (ref 32–36)
MCV RBC AUTO: 87 FL (ref 80–100)
MONOCYTES # BLD AUTO: 0.46 X10*3/UL (ref 0.1–1)
MONOCYTES NFR BLD AUTO: 8.2 %
MUCOUS THREADS #/AREA URNS AUTO: ABNORMAL /LPF
NEUTROPHILS # BLD AUTO: 3.24 X10*3/UL (ref 1.2–7.7)
NEUTROPHILS NFR BLD AUTO: 57.9 %
NITRITE UR QL STRIP.AUTO: NEGATIVE
NON HDL CHOLESTEROL: 183 MG/DL (ref 0–149)
NRBC BLD-RTO: 0 /100 WBCS (ref 0–0)
PH UR STRIP.AUTO: 6 [PH]
PLATELET # BLD AUTO: 307 X10*3/UL (ref 150–450)
POTASSIUM SERPL-SCNC: 4.4 MMOL/L (ref 3.5–5.3)
PROT SERPL-MCNC: 6.3 G/DL (ref 6.4–8.2)
PROT UR STRIP.AUTO-MCNC: NEGATIVE MG/DL
RBC # BLD AUTO: 4.51 X10*6/UL (ref 4–5.2)
RBC # UR STRIP.AUTO: NEGATIVE /UL
RBC #/AREA URNS AUTO: ABNORMAL /HPF
SODIUM SERPL-SCNC: 139 MMOL/L (ref 136–145)
SP GR UR STRIP.AUTO: 1.01
SQUAMOUS #/AREA URNS AUTO: ABNORMAL /HPF
TRIGL SERPL-MCNC: 164 MG/DL (ref 0–149)
TSH SERPL-ACNC: 0.79 MIU/L (ref 0.44–3.98)
UROBILINOGEN UR STRIP.AUTO-MCNC: NORMAL MG/DL
VLDL: 33 MG/DL (ref 0–40)
WBC # BLD AUTO: 5.6 X10*3/UL (ref 4.4–11.3)
WBC #/AREA URNS AUTO: ABNORMAL /HPF

## 2024-06-19 PROCEDURE — 85025 COMPLETE CBC W/AUTO DIFF WBC: CPT

## 2024-06-19 PROCEDURE — 36415 COLL VENOUS BLD VENIPUNCTURE: CPT

## 2024-06-19 PROCEDURE — 82306 VITAMIN D 25 HYDROXY: CPT

## 2024-06-19 PROCEDURE — 81001 URINALYSIS AUTO W/SCOPE: CPT

## 2024-06-19 PROCEDURE — 80053 COMPREHEN METABOLIC PANEL: CPT

## 2024-06-19 PROCEDURE — 84443 ASSAY THYROID STIM HORMONE: CPT

## 2024-06-19 PROCEDURE — 80061 LIPID PANEL: CPT

## 2024-08-14 ENCOUNTER — APPOINTMENT (OUTPATIENT)
Dept: OTOLARYNGOLOGY | Facility: CLINIC | Age: 64
End: 2024-08-14
Payer: COMMERCIAL

## 2024-08-14 DIAGNOSIS — H81.12 BENIGN PAROXYSMAL POSITIONAL VERTIGO OF LEFT EAR: Primary | ICD-10-CM

## 2024-08-14 PROCEDURE — 99213 OFFICE O/P EST LOW 20 MIN: CPT | Performed by: OTOLARYNGOLOGY

## 2024-08-14 PROCEDURE — 1036F TOBACCO NON-USER: CPT | Performed by: OTOLARYNGOLOGY

## 2024-08-14 NOTE — PROGRESS NOTES
History Of Present Illness    08.14.2024: She hears her hearth beat in her head, off and on. This has been going on for a long time. It may happen about 4 times a month. May last for hours.  She had a CT scan on 11.05.2023, when it was worse. It was essentially normal.     Her positional dizziness came back, it is positional at left.  She uses propranolol.   Head pressure (+), no headache.     On examination, TMs intact.    Plan:  1- follow up if heart beat in head gets worse or becomes more frequent.   ______________________________________________________________________    02.07.2024: BPPV is gone.  Hearing test shows bilateral presbyacusis, slightly worse in right ear.    Recommendations:  1- Consider hearing aids  2- Consider lenire for tinnitus  3- consider BMD, since BPPV could be more prevalent in women with osteopenia or osteoporosis  _________________________________________________________________    Lucia MELLISA Shields is a 63 y.o. female presenting with poor hearing. She is kindly referred by Dr. Stephanie Shelley. About 7 years ago she woke up with loud tinnitus and hearing loss. She has bilateral hearing loss, worse in her right ear.     She recently has dizziness. She had a CT scan on 11.05.2023. It was essentially normal.   She feels dizzy, when she lays back.  She has glaucoma. Has retinal disease. Using eye drops. Eye drops make her dizzy too.  She has thyroid disease, uses propranolol.    On examination, TMs look intact.  Nasal septum deviated to left  No postnasal discharge  No palpable neck mass.    John-Hallpike maneuver has shown BPPV.  Left Epley maneuver was performed.     Plan:  1- hearing test  2- follow up after the test  3- BPPV recommendations    Past Medical History  She has a past medical history of Abnormal MRI of the head (06/01/2023), COVID-19 (06/01/2023), Diffuse pain in left lower extremity (06/01/2023), Ganglion of left wrist (06/01/2023), Headache disorder (06/01/2023), Multiple  sites, insect bite, nonvenomous (2023), Nasal congestion with rhinorrhea (2023), Neck muscle spasm (2023), Personal history of other endocrine, nutritional and metabolic disease (2015), Personal history of other infectious and parasitic diseases (2015), and Personal history of urinary calculi (2015).    Surgical History  She has a past surgical history that includes  section, classic (2015); Other surgical history (2015); Lithotripsy (2015); and Dilation and curettage of uterus (2015).     Social History  She reports that she has never smoked. She has never used smokeless tobacco. No history on file for alcohol use and drug use.    Family History  No family history on file.     Allergies  Pollen extracts    Review of Systems (initial ROS)  Difficulty hearing  Vertigo  Tinnitus  Palpitations       Physical Exam (old exam)    General appearance: Healthy-appearing, well-nourished, well groomed, in no acute distress.     Head and Face: Atraumatic with no masses, lesions, or scarring.      Salivary glands: No tenderness of the parotid glands or parotid masses.     No tenderness of the submandibular glands or submandibular masses.      Facial strength: Normal strength and symmetry, no synkinesis or facial tic.     Eyes: Conjunctivas look non-hyperemic bilaterally    Ears: Bilaterally ear canals look normal. Tympanic membranes look intact, no hyperemia, fluid or retraction. Hearing grossly normal.      Nose: Mucosa looks normal. No purulent discharge. Septum deviated to left.     Oral Cavity/Mouth: Lips and tongue look normal.     Throat: No postnasal discharge. No tonsil hypertrophy. No hyperemia.    Neck: Symmetrical, trachea midline.     Pulmonary: Normal respiratory effort.     Lymphatic: No palpable pathologic lymph nodes at neck.     Neurological/Psychiatric Orientation to person, place, and time: Normal.     Mood and affect: Normal.       Extremities: No clubbing.     Skin: No significant skin lesions were noted at face or neck      Procedure    JAN HALLPIKE TEST AND EPLEY MANEUVER    Patient was placed on examination table, head was rotated towards left side ~45 degrees, and laid back. Head was extended. Dizziness with nystagmus was observed. After dizziness nystagmus has stopped, patient's head was rotated ~90 degrees towards right side, and then further rotated ~90 degrees to right. Then, keeping the head stable patient was sat up. Maneuver was completed.       Last Recorded Vitals  There were no vitals taken for this visit.    Relevant Results  Prior to Admission medications    Medication Sig Start Date End Date Taking? Authorizing Provider   albuterol 90 mcg/actuation inhaler INHALE 2 PUFFS VIA SPACER DEVICE EVERY 4 TO 6 HOURS AS NEEDED.  INHALE ONE PUFF THEN TAKE 5 - 6 EASY BREATHS  REST ONE MINUTE THEN REPEAT.    Historical Provider, MD   citalopram (CeleXA) 20 mg tablet once every 24 hours.    Historical Provider, MD   estradiol (Vagifem) 10 mcg tablet vaginal tablet Insert into the vagina. 10/10/19   Historical Provider, MD   fluticasone (Flonase) 50 mcg/actuation nasal spray Administer into affected nostril(s). 9/2/20   Historical Provider, MD   methocarbamol (Robaxin) 500 mg tablet Take 1 tablet (500 mg) by mouth 4 times a day as needed for muscle spasms. 12/13/23 1/12/24  Stephanie Shelley MD   montelukast (Singulair) 10 mg tablet Take 1 tablet (10 mg) by mouth once daily at bedtime.    Historical Provider, MD   multivitamin tablet once every 24 hours.    Historical Provider, MD   omeprazole (PriLOSEC) 40 mg DR capsule Take 1 capsule (40 mg) by mouth 2 times a day. 12/18/22   Historical Provider, MD   propranolol LA (Inderal LA) 60 mg 24 hr capsule Take 1 capsule (60 mg) by mouth once daily. 6/1/23   Stephanie Shelley MD   sucralfate (Carafate) 1 gram tablet TAKE ONE TABLET BY MOUTH FOUR TIMES A DAY  before meals and at bedtime  10/14/22   Historical Provider, MD   timolol (Timoptic) 0.5 % ophthalmic solution INSTILL ONE DROP EVERY DAY IN THE MORNING. INTO EACH EYE 4/30/23   Historical Provider, MD   tiZANidine (Zanaflex) 4 mg tablet Take 1 tablet (4 mg) by mouth every 8 hours if needed for muscle spasms for up to 10 days. 9/27/23 10/7/23  Stephanie Shelley MD     No results found.      Assessment/Plan     08.14.2024: She hears her hearth beat in her head, off and on. This has been going on for a long time. It may happen about 4 times a month. May last for hours.  She had a CT scan on 11.05.2023, when it was worse. It was essentially normal.     Her positional dizziness came back, it is positional at left.  She uses propranolol.   Head pressure (+), no headache.     On examination, TMs intact.  John hallpike normal    Plan:  1- follow up if heart beat in head gets worse or becomes more frequent.   ______________________________________________________________________     02.07.2024: BPPV is gone.  Hearing test shows bilateral presbyacusis, slightly worse in right ear.    Recommendations:  1- Consider hearing aids  2- Consider lenire for tinnitus  3- consider BMD, since BPPV could be more prevalent in women with osteopenia or osteoporosis  _________________________________________________________________    Luciapilar Shields is a 62 y.o. female presenting with poor hearing. She is kindly referred by Dr. Stephanie Shelley. About 7 years ago she woke up with loud tinnitus and hearing loss. She has bilateral hearing loss, worse in her right ear.     She recently has dizziness. She had a CT scan on 11.05.2023. It was essentially normal.   She feels dizzy when she lays back.  She has glaucoma. Has retinal disease. Using eye drops. Eye drops make her dizzy too.  She has thyroid disease, uses propranolol.    On examination, TMs look intact.  Nasal septum deviated to left  No postnasal discharge  No palpable neck mass.    John-Hallpike maneuver has  shown BPPV.  Left Epley maneuver was performed.     Plan:  1- hearing test  2- follow up after the test  3- BPPV recommendations    Romain Godfrey  Otolaryngology - Head & Neck Surgery

## 2024-08-28 ENCOUNTER — TELEPHONE (OUTPATIENT)
Dept: PRIMARY CARE | Facility: CLINIC | Age: 64
End: 2024-08-28
Payer: COMMERCIAL

## 2024-08-28 NOTE — TELEPHONE ENCOUNTER
Pt scheduled for hospital follow up next week. Went in for pelvic pain and diagnosed with diverticulitis and was given antibiotics. Pt is wanting to know if she can be prescribed a higher dose of Tylenol for pain because she can't take advil.

## 2024-09-04 ENCOUNTER — APPOINTMENT (OUTPATIENT)
Dept: PRIMARY CARE | Facility: CLINIC | Age: 64
End: 2024-09-04
Payer: COMMERCIAL

## 2024-09-04 VITALS
BODY MASS INDEX: 36.78 KG/M2 | TEMPERATURE: 97 F | DIASTOLIC BLOOD PRESSURE: 70 MMHG | OXYGEN SATURATION: 96 % | WEIGHT: 221 LBS | SYSTOLIC BLOOD PRESSURE: 110 MMHG | HEART RATE: 83 BPM

## 2024-09-04 DIAGNOSIS — K57.92 DIVERTICULITIS: ICD-10-CM

## 2024-09-04 DIAGNOSIS — F41.1 GENERALIZED ANXIETY DISORDER: Primary | ICD-10-CM

## 2024-09-04 PROBLEM — M62.838 MUSCLE SPASMS OF NECK: Status: ACTIVE | Noted: 2024-09-04

## 2024-09-04 PROCEDURE — 99213 OFFICE O/P EST LOW 20 MIN: CPT | Performed by: FAMILY MEDICINE

## 2024-09-04 RX ORDER — AMOXICILLIN AND CLAVULANATE POTASSIUM 875; 125 MG/1; MG/1
875 TABLET, FILM COATED ORAL 2 TIMES DAILY
Qty: 20 TABLET | Refills: 0 | Status: SHIPPED | OUTPATIENT
Start: 2024-09-04 | End: 2024-09-14

## 2024-09-04 RX ORDER — CITALOPRAM 20 MG/1
20 TABLET, FILM COATED ORAL DAILY
Qty: 90 TABLET | Refills: 3 | Status: SHIPPED | OUTPATIENT
Start: 2024-09-04 | End: 2025-09-04

## 2024-09-04 ASSESSMENT — ENCOUNTER SYMPTOMS
DIZZINESS: 0
SHORTNESS OF BREATH: 0
HEADACHES: 0
DIARRHEA: 0
FEVER: 0
JOINT SWELLING: 0
NAUSEA: 0
HEMATURIA: 0
ABDOMINAL PAIN: 1
COUGH: 0
CONFUSION: 0
PALPITATIONS: 0
UNEXPECTED WEIGHT CHANGE: 0
NUMBNESS: 0
DYSURIA: 0
FATIGUE: 0
BLOOD IN STOOL: 0
DECREASED CONCENTRATION: 0
EYE PAIN: 0
FREQUENCY: 0
WEAKNESS: 0
VOMITING: 0
TROUBLE SWALLOWING: 0
SORE THROAT: 0
HALLUCINATIONS: 0

## 2024-09-04 ASSESSMENT — PAIN SCALES - GENERAL: PAINLEVEL: 1

## 2024-09-04 NOTE — PROGRESS NOTES
Subjective   Patient ID: Lucia Shields is a 63 y.o. female.    Patient here for ER follow up for diverticulitis flare up. 2nd one in 1.5 years. Both in sigmoid colon. Discussed diet, prevention by avoiding seeds and small indigestible foods such as nuts and popcorn, seeds. Also discussed high fiber diet when issue resolves. Almost back to normal. Going to [place] next month. Worried about getting another flare up. Did poorly with flagyl. Stools formed. No blood. No fever, minimal pain.         Review of Systems   Constitutional:  Negative for fatigue, fever and unexpected weight change.   HENT:  Negative for congestion, ear pain, hearing loss, sore throat and trouble swallowing.    Eyes:  Negative for pain and visual disturbance.   Respiratory:  Negative for cough and shortness of breath.    Cardiovascular:  Negative for chest pain, palpitations and leg swelling.   Gastrointestinal:  Positive for abdominal pain. Negative for blood in stool, diarrhea, nausea and vomiting.   Genitourinary:  Negative for dysuria, frequency, hematuria and urgency.   Musculoskeletal:  Negative for joint swelling.   Skin:  Negative for pallor and rash.   Neurological:  Negative for dizziness, syncope, weakness, numbness and headaches.   Psychiatric/Behavioral:  Negative for confusion, decreased concentration, hallucinations and suicidal ideas.      Vitals:    09/04/24 1339   BP: 110/70   Pulse: 83   Temp: 36.1 °C (97 °F)   SpO2: 96%      Objective   Physical Exam  Constitutional:       Appearance: Normal appearance.   HENT:      Head: Normocephalic and atraumatic.   Eyes:      Extraocular Movements: Extraocular movements intact.      Conjunctiva/sclera: Conjunctivae normal.      Pupils: Pupils are equal, round, and reactive to light.   Cardiovascular:      Rate and Rhythm: Normal rate and regular rhythm.      Heart sounds: Normal heart sounds.   Pulmonary:      Effort: Pulmonary effort is normal.      Breath sounds: Normal breath  sounds.   Abdominal:      General: Abdomen is flat.      Palpations: Abdomen is soft. There is no mass.      Tenderness: There is no abdominal tenderness. There is no guarding.      Comments: Mild LLQ tenderness. No guarding or rebound.   Musculoskeletal:      Cervical back: Neck supple.   Lymphadenopathy:      Cervical: No cervical adenopathy.   Skin:     General: Skin is warm and dry.   Neurological:      General: No focal deficit present.      Mental Status: She is alert.   Psychiatric:         Mood and Affect: Mood normal.         Speech: Speech normal.         Behavior: Behavior normal.         Cognition and Memory: Cognition normal.         Assessment/Plan   Diagnoses and all orders for this visit:  Generalized anxiety disorder  -     citalopram (CeleXA) 20 mg tablet; Take 1 tablet (20 mg) by mouth once daily.  Diverticulitis  -     amoxicillin-pot clavulanate (Augmentin) 875-125 mg tablet; Take 1 tablet (875 mg) by mouth 2 times a day for 10 days.

## 2024-09-04 NOTE — PATIENT INSTRUCTIONS
It was nice to see you today!  Discussed current concerns and addressed   Reviewed recent labs and diagnostics  Reviewed medications list  Continue to eat a healthy diet, exercise at least 3 times a week or more  Plan and follow up discussed  For any further information related to your condition, copy and paste or go to [email or web address]  Discussed dx, prevention and tx  Rx for [place]. Precautions given  Would like to start back on citalopram before [place] as has anxiety about trip

## 2024-09-16 ENCOUNTER — TELEPHONE (OUTPATIENT)
Dept: PRIMARY CARE | Facility: CLINIC | Age: 64
End: 2024-09-16
Payer: COMMERCIAL

## 2024-09-16 NOTE — TELEPHONE ENCOUNTER
Pt called in again stating she has reoccurring diverticulitis  and feels weak and wants to do blood work. Stated she started antibiotic Friday when it returned.  Would you like to see pt again or does she need to go to ER or GI?

## 2024-09-19 ENCOUNTER — LAB (OUTPATIENT)
Dept: LAB | Facility: LAB | Age: 64
End: 2024-09-19
Payer: COMMERCIAL

## 2024-09-19 DIAGNOSIS — R10.30 LOWER ABDOMINAL PAIN, UNSPECIFIED: Primary | ICD-10-CM

## 2024-09-19 DIAGNOSIS — K57.92 DIVERTICULITIS OF INTESTINE, PART UNSPECIFIED, WITHOUT PERFORATION OR ABSCESS WITHOUT BLEEDING: ICD-10-CM

## 2024-09-19 LAB
ALBUMIN SERPL BCP-MCNC: 4.4 G/DL (ref 3.4–5)
ALP SERPL-CCNC: 63 U/L (ref 33–136)
ALT SERPL W P-5'-P-CCNC: 15 U/L (ref 7–45)
ANION GAP SERPL CALC-SCNC: 16 MMOL/L (ref 10–20)
AST SERPL W P-5'-P-CCNC: 12 U/L (ref 9–39)
BILIRUB SERPL-MCNC: 0.6 MG/DL (ref 0–1.2)
BUN SERPL-MCNC: 11 MG/DL (ref 6–23)
CALCIUM SERPL-MCNC: 9.6 MG/DL (ref 8.6–10.3)
CHLORIDE SERPL-SCNC: 98 MMOL/L (ref 98–107)
CO2 SERPL-SCNC: 28 MMOL/L (ref 21–32)
CREAT SERPL-MCNC: 0.55 MG/DL (ref 0.5–1.05)
EGFRCR SERPLBLD CKD-EPI 2021: >90 ML/MIN/1.73M*2
ERYTHROCYTE [DISTWIDTH] IN BLOOD BY AUTOMATED COUNT: 13.3 % (ref 11.5–14.5)
GLUCOSE SERPL-MCNC: 85 MG/DL (ref 74–99)
HCT VFR BLD AUTO: 41.7 % (ref 36–46)
HGB BLD-MCNC: 13.2 G/DL (ref 12–16)
MCH RBC QN AUTO: 27.7 PG (ref 26–34)
MCHC RBC AUTO-ENTMCNC: 31.7 G/DL (ref 32–36)
MCV RBC AUTO: 87 FL (ref 80–100)
NRBC BLD-RTO: 0 /100 WBCS (ref 0–0)
PLATELET # BLD AUTO: 350 X10*3/UL (ref 150–450)
POTASSIUM SERPL-SCNC: 4.2 MMOL/L (ref 3.5–5.3)
PROT SERPL-MCNC: 7.2 G/DL (ref 6.4–8.2)
RBC # BLD AUTO: 4.77 X10*6/UL (ref 4–5.2)
SODIUM SERPL-SCNC: 138 MMOL/L (ref 136–145)
WBC # BLD AUTO: 6 X10*3/UL (ref 4.4–11.3)

## 2024-09-19 PROCEDURE — 36415 COLL VENOUS BLD VENIPUNCTURE: CPT

## 2024-09-19 PROCEDURE — 85027 COMPLETE CBC AUTOMATED: CPT

## 2024-09-19 PROCEDURE — 80053 COMPREHEN METABOLIC PANEL: CPT

## 2024-10-02 ENCOUNTER — OFFICE VISIT (OUTPATIENT)
Dept: PRIMARY CARE | Facility: CLINIC | Age: 64
End: 2024-10-02
Payer: COMMERCIAL

## 2024-10-02 VITALS
SYSTOLIC BLOOD PRESSURE: 128 MMHG | TEMPERATURE: 97.8 F | BODY MASS INDEX: 35.05 KG/M2 | OXYGEN SATURATION: 99 % | HEART RATE: 68 BPM | DIASTOLIC BLOOD PRESSURE: 78 MMHG | WEIGHT: 210.6 LBS

## 2024-10-02 DIAGNOSIS — K57.32 DIVERTICULITIS OF LARGE INTESTINE WITHOUT PERFORATION OR ABSCESS WITHOUT BLEEDING: Primary | ICD-10-CM

## 2024-10-02 PROCEDURE — 99213 OFFICE O/P EST LOW 20 MIN: CPT | Performed by: FAMILY MEDICINE

## 2024-10-02 RX ORDER — ONDANSETRON 4 MG/1
TABLET, ORALLY DISINTEGRATING ORAL
COMMUNITY
Start: 2024-09-19

## 2024-10-02 RX ORDER — CIPROFLOXACIN 250 MG/1
250 TABLET, FILM COATED ORAL 2 TIMES DAILY
Qty: 14 TABLET | Refills: 0 | Status: SHIPPED | OUTPATIENT
Start: 2024-10-02 | End: 2024-10-09

## 2024-10-02 RX ORDER — METHYLPREDNISOLONE 4 MG/1
TABLET ORAL
COMMUNITY
Start: 2024-09-26

## 2024-10-02 RX ORDER — METRONIDAZOLE 500 MG/1
500 TABLET ORAL 3 TIMES DAILY
Qty: 21 TABLET | Refills: 0 | Status: SHIPPED | OUTPATIENT
Start: 2024-10-02 | End: 2024-10-09

## 2024-10-02 ASSESSMENT — ENCOUNTER SYMPTOMS
DIZZINESS: 0
DYSURIA: 0
JOINT SWELLING: 0
SHORTNESS OF BREATH: 0
WEAKNESS: 0
NUMBNESS: 0
DECREASED CONCENTRATION: 0
HEMATURIA: 0
FEVER: 0
COUGH: 0
HEADACHES: 0
HALLUCINATIONS: 0
CONFUSION: 0
BLOOD IN STOOL: 0
UNEXPECTED WEIGHT CHANGE: 0
ABDOMINAL PAIN: 1
PALPITATIONS: 0
DIARRHEA: 1
EYE PAIN: 0
NAUSEA: 0
SORE THROAT: 0
VOMITING: 0
TROUBLE SWALLOWING: 0
FREQUENCY: 0
FATIGUE: 0

## 2024-10-02 ASSESSMENT — PATIENT HEALTH QUESTIONNAIRE - PHQ9
1. LITTLE INTEREST OR PLEASURE IN DOING THINGS: SEVERAL DAYS
SUM OF ALL RESPONSES TO PHQ9 QUESTIONS 1 AND 2: 2
2. FEELING DOWN, DEPRESSED OR HOPELESS: SEVERAL DAYS

## 2024-10-02 ASSESSMENT — PAIN SCALES - GENERAL: PAINLEVEL: 0-NO PAIN

## 2024-10-02 NOTE — PROGRESS NOTES
Subjective   Patient ID: Lucia Shields is a 63 y.o. female.    Patient with known history of diverticulitis.  She states she is had 2 flareups in the last year but it seems like it was a little bit more.  She had a colonoscopy which showed sigmoid diverticulosis.  She is leaving for [place] this weekend and would like to have medications just in case.  She has a few twinges in the left lower quadrant.  There is no fever or chills but she has had diarrhea or rather loose stools for the last few weeks.  I had encouraged her to see colorectal surgery and she does not want to do that unless it is a last resort.  We reviewed diet which she has been sticking to a bland foods and trying to eat more fiber.  No blood in the stool.        Review of Systems   Constitutional:  Negative for fatigue, fever and unexpected weight change.   HENT:  Negative for congestion, ear pain, hearing loss, sore throat and trouble swallowing.    Eyes:  Negative for pain and visual disturbance.   Respiratory:  Negative for cough and shortness of breath.    Cardiovascular:  Negative for chest pain, palpitations and leg swelling.   Gastrointestinal:  Positive for abdominal pain and diarrhea. Negative for blood in stool, nausea and vomiting.   Genitourinary:  Negative for dysuria, frequency, hematuria and urgency.   Musculoskeletal:  Negative for joint swelling.   Skin:  Negative for pallor and rash.   Neurological:  Negative for dizziness, syncope, weakness, numbness and headaches.   Psychiatric/Behavioral:  Negative for confusion, decreased concentration, hallucinations and suicidal ideas.      Vitals:    10/02/24 1127   BP: 128/78   Pulse: 68   Temp: 36.6 °C (97.8 °F)   SpO2: 99%      Objective   Physical Exam    Assessment/Plan   Diagnoses and all orders for this visit:  Diverticulitis of large intestine without perforation or abscess without bleeding  -     Referral to Colorectal Surgery; Future  -     ciprofloxacin (Cipro) 250 mg tablet;  Take 1 tablet (250 mg) by mouth 2 times a day for 7 days.  -     metroNIDAZOLE (Flagyl) 500 mg tablet; Take 1 tablet (500 mg) by mouth 3 times a day for 7 days.

## 2024-10-02 NOTE — PATIENT INSTRUCTIONS
It was nice to see you today!  Discussed current concerns and addressed   Reviewed recent labs and diagnostics  Reviewed medications list  Continue to eat a healthy diet, exercise at least 3 times a week or more  Plan and follow up discussed  For any further information related to your condition, copy and paste or go to [email or web address]  Immodium prn  Recommending seeing colorectal surgeon. Rx given to take to [place].   Precautions discussed

## 2024-12-10 ENCOUNTER — OFFICE VISIT (OUTPATIENT)
Dept: PRIMARY CARE | Facility: CLINIC | Age: 64
End: 2024-12-10
Payer: COMMERCIAL

## 2024-12-10 VITALS
HEIGHT: 65 IN | BODY MASS INDEX: 34.66 KG/M2 | SYSTOLIC BLOOD PRESSURE: 118 MMHG | DIASTOLIC BLOOD PRESSURE: 82 MMHG | WEIGHT: 208 LBS | OXYGEN SATURATION: 97 % | TEMPERATURE: 98.6 F | HEART RATE: 61 BPM

## 2024-12-10 DIAGNOSIS — M25.512 CHRONIC LEFT SHOULDER PAIN: Primary | ICD-10-CM

## 2024-12-10 DIAGNOSIS — G44.209 TENSION HEADACHE: ICD-10-CM

## 2024-12-10 DIAGNOSIS — G89.29 CHRONIC LEFT SHOULDER PAIN: Primary | ICD-10-CM

## 2024-12-10 PROCEDURE — 3008F BODY MASS INDEX DOCD: CPT | Performed by: FAMILY MEDICINE

## 2024-12-10 PROCEDURE — 99213 OFFICE O/P EST LOW 20 MIN: CPT | Performed by: FAMILY MEDICINE

## 2024-12-10 RX ORDER — METHYLPREDNISOLONE 4 MG/1
TABLET ORAL
Qty: 21 TABLET | Refills: 0 | Status: SHIPPED | OUTPATIENT
Start: 2024-12-10 | End: 2024-12-16

## 2024-12-10 ASSESSMENT — ENCOUNTER SYMPTOMS
CONFUSION: 0
ABDOMINAL PAIN: 0
BLOOD IN STOOL: 0
DECREASED CONCENTRATION: 0
EYE PAIN: 0
UNEXPECTED WEIGHT CHANGE: 0
NUMBNESS: 0
TROUBLE SWALLOWING: 0
JOINT SWELLING: 0
DYSURIA: 0
SHORTNESS OF BREATH: 0
ARTHRALGIAS: 1
FREQUENCY: 0
HEMATURIA: 0
FEVER: 0
HEADACHES: 1
WEAKNESS: 0
VOMITING: 0
SORE THROAT: 0
HALLUCINATIONS: 0
COUGH: 0
PALPITATIONS: 0
DIZZINESS: 0
FATIGUE: 0
DIARRHEA: 0
NAUSEA: 0

## 2024-12-10 NOTE — PATIENT INSTRUCTIONS
Tension headache.  Printed her out some stretches.  She will take a Medrol Dosepak.  Heat massage and stretching should help.  I will send her to orthopedics for her chronic shoulder pain.

## 2024-12-10 NOTE — PROGRESS NOTES
Subjective   Patient ID: Lucia Shields is a 63 y.o. female.    Patient with bitemporal headache that radiates to the neck.  She has neck tightness and pain.  No focal neurological deficits.  She has had these in the past.  Also persistent left shoulder pain.  She cannot sleep on it and there is pain with abduction.        Review of Systems   Constitutional:  Negative for fatigue, fever and unexpected weight change.   HENT:  Negative for congestion, ear pain, hearing loss, sore throat and trouble swallowing.    Eyes:  Negative for pain and visual disturbance.   Respiratory:  Negative for cough and shortness of breath.    Cardiovascular:  Negative for chest pain, palpitations and leg swelling.   Gastrointestinal:  Negative for abdominal pain, blood in stool, diarrhea, nausea and vomiting.   Genitourinary:  Negative for dysuria, frequency, hematuria and urgency.   Musculoskeletal:  Positive for arthralgias. Negative for joint swelling.   Skin:  Negative for pallor and rash.   Neurological:  Positive for headaches. Negative for dizziness, syncope, weakness and numbness.   Psychiatric/Behavioral:  Negative for confusion, decreased concentration, hallucinations and suicidal ideas.      Vitals:    12/10/24 1041   BP: 118/82   Pulse: 61   Temp: 37 °C (98.6 °F)   SpO2: 97%      Objective   Physical Exam  Constitutional:       Appearance: Normal appearance. She is obese.   Eyes:      Extraocular Movements: Extraocular movements intact.      Conjunctiva/sclera: Conjunctivae normal.      Pupils: Pupils are equal, round, and reactive to light.   Neck:      Comments: Trapezius muscles are tight and tender, paraspinous muscles, cervical, are tender.  Musculoskeletal:      Comments: There is discomfort with abduction.  Strength is good.   Neurological:      General: No focal deficit present.      Mental Status: She is alert and oriented to person, place, and time.         Assessment/Plan   Diagnoses and all orders for this  visit:  Chronic left shoulder pain  -     Referral to Orthopaedic Surgery; Future  Tension headache

## 2024-12-18 ENCOUNTER — TELEPHONE (OUTPATIENT)
Dept: PRIMARY CARE | Facility: CLINIC | Age: 64
End: 2024-12-18
Payer: COMMERCIAL

## 2024-12-18 NOTE — TELEPHONE ENCOUNTER
Patient called in stating she believes she has a sinus infection. Symptoms have been for two days. Sore throat, cough with drops of blood, fever. No nausea, vomiting or diarrhea. Patient was seen on 12/10 and given a medrol dose pac for headaches.     Please advise.

## 2025-01-03 ENCOUNTER — OFFICE VISIT (OUTPATIENT)
Dept: PRIMARY CARE | Facility: CLINIC | Age: 65
End: 2025-01-03
Payer: COMMERCIAL

## 2025-01-03 VITALS
BODY MASS INDEX: 34.45 KG/M2 | OXYGEN SATURATION: 98 % | SYSTOLIC BLOOD PRESSURE: 120 MMHG | HEART RATE: 88 BPM | TEMPERATURE: 98.2 F | DIASTOLIC BLOOD PRESSURE: 70 MMHG | WEIGHT: 207 LBS

## 2025-01-03 DIAGNOSIS — J45.41 MODERATE PERSISTENT ASTHMA WITH EXACERBATION (HHS-HCC): Primary | ICD-10-CM

## 2025-01-03 DIAGNOSIS — J18.9 PNEUMONIA OF LOWER LOBE DUE TO INFECTIOUS ORGANISM, UNSPECIFIED LATERALITY: ICD-10-CM

## 2025-01-03 PROCEDURE — 99213 OFFICE O/P EST LOW 20 MIN: CPT | Performed by: FAMILY MEDICINE

## 2025-01-03 RX ORDER — LEVOFLOXACIN 500 MG/1
500 TABLET, FILM COATED ORAL DAILY
Qty: 7 TABLET | Refills: 0 | Status: SHIPPED | OUTPATIENT
Start: 2025-01-03 | End: 2025-01-10

## 2025-01-03 RX ORDER — PROMETHAZINE HYDROCHLORIDE AND DEXTROMETHORPHAN HYDROBROMIDE 6.25; 15 MG/5ML; MG/5ML
5-10 SYRUP ORAL EVERY 4 HOURS PRN
Qty: 240 ML | Refills: 1 | Status: SHIPPED | OUTPATIENT
Start: 2025-01-03 | End: 2025-02-02

## 2025-01-03 RX ORDER — ALBUTEROL SULFATE 0.83 MG/ML
2.5 SOLUTION RESPIRATORY (INHALATION) 4 TIMES DAILY PRN
Qty: 270 ML | Refills: 1 | Status: SHIPPED | OUTPATIENT
Start: 2025-01-03 | End: 2026-01-03

## 2025-01-03 ASSESSMENT — PAIN SCALES - GENERAL: PAINLEVEL_OUTOF10: 3

## 2025-01-14 ASSESSMENT — ENCOUNTER SYMPTOMS
DYSURIA: 0
CONFUSION: 0
PALPITATIONS: 0
ABDOMINAL PAIN: 0
DIZZINESS: 0
SINUS PRESSURE: 1
HEADACHES: 0
HALLUCINATIONS: 0
DECREASED CONCENTRATION: 0
FATIGUE: 1
WEAKNESS: 0
JOINT SWELLING: 0
SHORTNESS OF BREATH: 0
TROUBLE SWALLOWING: 0
NUMBNESS: 0
FREQUENCY: 0
HEMATURIA: 0
FEVER: 0
UNEXPECTED WEIGHT CHANGE: 0
RHINORRHEA: 1
EYE PAIN: 0
DIARRHEA: 0
SINUS PAIN: 1
NAUSEA: 0
COUGH: 1
BLOOD IN STOOL: 0
SORE THROAT: 1
VOMITING: 0

## 2025-01-14 NOTE — PROGRESS NOTES
Subjective   Patient ID: Lucia Shields is a 64 y.o. female.    Patient with history of RSV last month.  She also has asthma.  She has a nebulizer at home and has been using it but needs more medicine.  She never quite recovered and is now coughing up greenish-yellow phlegm, having thick nasal drainage, is very tired and wheezing.Chest x-ray showed no focal consolidation, effusion or pneumothorax.  She is not a smoker nor has she been.  She is on montelukast and Flonase.  She is up at night coughing.        Review of Systems   Constitutional:  Positive for fatigue. Negative for fever and unexpected weight change.   HENT:  Positive for congestion, rhinorrhea, sinus pressure, sinus pain and sore throat. Negative for ear pain, hearing loss and trouble swallowing.    Eyes:  Negative for pain and visual disturbance.   Respiratory:  Positive for cough. Negative for shortness of breath.    Cardiovascular:  Negative for chest pain, palpitations and leg swelling.   Gastrointestinal:  Negative for abdominal pain, blood in stool, diarrhea, nausea and vomiting.   Genitourinary:  Negative for dysuria, frequency, hematuria and urgency.   Musculoskeletal:  Negative for joint swelling.   Skin:  Negative for pallor and rash.   Neurological:  Negative for dizziness, syncope, weakness, numbness and headaches.   Psychiatric/Behavioral:  Negative for confusion, decreased concentration, hallucinations and suicidal ideas.      Vitals:    01/03/25 1438   BP: 120/70   Pulse: 88   Temp: 36.8 °C (98.2 °F)   SpO2: 98%      Objective   Physical Exam  Constitutional:       Appearance: She is not toxic-appearing.   HENT:      Head: Normocephalic and atraumatic.      Nose: Congestion present.      Mouth/Throat:      Mouth: Mucous membranes are moist.      Pharynx: Posterior oropharyngeal erythema present. No oropharyngeal exudate.   Eyes:      Extraocular Movements: Extraocular movements intact.      Conjunctiva/sclera: Conjunctivae normal.       Pupils: Pupils are equal, round, and reactive to light.   Cardiovascular:      Rate and Rhythm: Normal rate and regular rhythm.      Heart sounds: No murmur heard.     No friction rub. No gallop.   Pulmonary:      Effort: Pulmonary effort is normal.      Breath sounds: Wheezing present.      Comments: Rales left lower lobe  Musculoskeletal:      Cervical back: Normal range of motion and neck supple.   Lymphadenopathy:      Cervical: Cervical adenopathy present.   Skin:     General: Skin is warm and dry.      Coloration: Skin is not jaundiced.      Findings: No rash.   Neurological:      General: No focal deficit present.      Mental Status: She is alert and oriented to person, place, and time.           Assessment/Plan   Diagnoses and all orders for this visit:  Moderate persistent asthma with exacerbation ([facility])  -     albuterol 2.5 mg /3 mL (0.083 %) nebulizer solution; Take 3 mL (2.5 mg) by nebulization 4 times a day as needed for wheezing or shortness of breath.  Pneumonia of lower lobe due to infectious organism, unspecified laterality  -     levoFLOXacin (Levaquin) 500 mg tablet; Take 1 tablet (500 mg) by mouth once daily for 7 days.  -     promethazine-DM (Phenergan-DM) 6.25-15 mg/5 mL syrup; Take 5-10 mL by mouth every 4 hours if needed for cough.

## 2025-01-14 NOTE — PATIENT INSTRUCTIONS
Most likely developed bacterial infection on top of RSV.  Continue albuterol by nebulizer.  Gave her samples of Trelegy to use over the next month.  She has Rales in the left lower lung field I suspect there is a pneumonia.  She is not in distress and is stable for home discharge.  I will give her Levaquin, Phenergan DM.  She has already had steroids.  If she worsens she is to go back to the emergency room but hopefully we can clear this up at home.  Avoid allergens.  Drink lots of fluids and rest.

## 2025-04-03 ENCOUNTER — OFFICE VISIT (OUTPATIENT)
Dept: PRIMARY CARE | Facility: CLINIC | Age: 65
End: 2025-04-03
Payer: COMMERCIAL

## 2025-04-03 VITALS
DIASTOLIC BLOOD PRESSURE: 70 MMHG | OXYGEN SATURATION: 98 % | TEMPERATURE: 98.2 F | BODY MASS INDEX: 35.11 KG/M2 | WEIGHT: 211 LBS | HEART RATE: 64 BPM | SYSTOLIC BLOOD PRESSURE: 130 MMHG

## 2025-04-03 DIAGNOSIS — M47.22 OSTEOARTHRITIS OF SPINE WITH RADICULOPATHY, CERVICAL REGION: Primary | ICD-10-CM

## 2025-04-03 DIAGNOSIS — Z13.820 ENCOUNTER FOR OSTEOPOROSIS SCREENING IN ASYMPTOMATIC POSTMENOPAUSAL PATIENT: ICD-10-CM

## 2025-04-03 DIAGNOSIS — E07.89 THYROID MASS OF UNCLEAR ETIOLOGY: ICD-10-CM

## 2025-04-03 DIAGNOSIS — Z78.0 ENCOUNTER FOR OSTEOPOROSIS SCREENING IN ASYMPTOMATIC POSTMENOPAUSAL PATIENT: ICD-10-CM

## 2025-04-03 PROCEDURE — 99213 OFFICE O/P EST LOW 20 MIN: CPT | Performed by: FAMILY MEDICINE

## 2025-04-03 PROCEDURE — 1036F TOBACCO NON-USER: CPT | Performed by: FAMILY MEDICINE

## 2025-04-03 RX ORDER — METHYLPREDNISOLONE 4 MG/1
TABLET ORAL
Qty: 21 TABLET | Refills: 0 | Status: SHIPPED | OUTPATIENT
Start: 2025-04-03

## 2025-04-03 ASSESSMENT — ENCOUNTER SYMPTOMS
LIGHT-HEADEDNESS: 0
FATIGUE: 0
COUGH: 0
NUMBNESS: 1
SHORTNESS OF BREATH: 0
SINUS PAIN: 0
BRUISES/BLEEDS EASILY: 0
NERVOUS/ANXIOUS: 1
NAUSEA: 0
UNEXPECTED WEIGHT CHANGE: 0
PALPITATIONS: 0
TROUBLE SWALLOWING: 0
DYSPHORIC MOOD: 0
FEVER: 0
VOMITING: 0
HEMATURIA: 0
BLOOD IN STOOL: 0
ABDOMINAL PAIN: 0
JOINT SWELLING: 0
DIARRHEA: 0
TREMORS: 0
WEAKNESS: 1
DYSURIA: 0

## 2025-04-03 ASSESSMENT — PAIN SCALES - GENERAL: PAINLEVEL_OUTOF10: 0-NO PAIN

## 2025-04-03 NOTE — PROGRESS NOTES
Subjective   Patient ID: Lucia Shields is a 64 y.o. female.    Patient went to the ER Monday because she developed numbness tingling in the arms, the entire portion of the hands and a little bit of her left face.  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  C-spine has moderate to severe degeneration around the 5-6 region and below.  She is in physical therapy for her feet and we discussed adding on the neck. She is not a smoker.  She states her  is worsened and she feels weak        Review of Systems   Constitutional:  Negative for fatigue, fever and unexpected weight change.   HENT:  Negative for congestion, ear pain, nosebleeds, sinus pain and trouble swallowing.    Eyes:  Negative for visual disturbance.   Respiratory:  Negative for cough and shortness of breath.    Cardiovascular:  Negative for chest pain and palpitations.   Gastrointestinal:  Negative for abdominal pain, blood in stool, diarrhea, nausea and vomiting.   Genitourinary:  Negative for dysuria and hematuria.   Musculoskeletal:  Negative for gait problem and joint swelling.   Neurological:  Positive for weakness and numbness. Negative for tremors and light-headedness.   Hematological:  Does not bruise/bleed easily.   Psychiatric/Behavioral:  Negative for dysphoric mood and suicidal ideas. The patient is nervous/anxious.      Vitals:    04/03/25 1054   BP: 130/70   Pulse: 64   Temp: 36.8 °C (98.2 °F)   SpO2: 98%      Body mass index is 35.11 kg/m².  Objective   Physical Exam  Constitutional:       Appearance: Normal appearance. She is obese.   Cardiovascular:      Rate and Rhythm: Normal rate and regular rhythm.      Pulses: Normal pulses.      Heart sounds: Normal heart sounds.   Pulmonary:      Effort: Pulmonary effort is normal.      Breath sounds: Normal breath sounds.   Musculoskeletal:      Cervical back: Neck supple.      Right lower leg: No edema.      Left  lower leg: No edema.      Comments: Upper extremities are weak throughout nothing focal.  Weakness is very mild.  Negative Phalen or Tinel at the wrist   Skin:     General: Skin is warm and dry.      Capillary Refill: Capillary refill takes less than 2 seconds.   Neurological:      General: No focal deficit present.      Mental Status: She is alert.      Comments: Sensation is intact to touch.  She is symmetrically slightly weak upper extremity and hands   Psychiatric:         Mood and Affect: Mood normal.         Speech: Speech normal.         Behavior: Behavior normal.         Cognition and Memory: Cognition normal.         Last Labs:     CMP:   Lab Results   Component Value Date    CALCIUM 9.6 09/19/2024    CALCIUM 9.0 06/19/2024    PROT 7.2 09/19/2024    PROT 6.3 (L) 06/19/2024    ALBUMIN 4.4 09/19/2024    ALBUMIN 4.1 06/19/2024    AST 12 09/19/2024    AST 13 06/19/2024    ALKPHOS 63 09/19/2024    ALKPHOS 61 06/19/2024    BILITOT 0.6 09/19/2024    BILITOT 0.8 06/19/2024     CBC:   Lab Results   Component Value Date    WBC 6.0 09/19/2024    WBC 5.6 06/19/2024    HGB 13.2 09/19/2024    HGB 12.5 06/19/2024    HCT 41.7 09/19/2024    HCT 39.2 06/19/2024    MCV 87 09/19/2024    MCV 87 06/19/2024     09/19/2024     06/19/2024     A1C:   Lab Results   Component Value Date    HGBA1C 5.0 06/26/2023    HGBA1C 5.0 10/27/2021     LIPID PANEL:   Lab Results   Component Value Date    CHOL 231 (H) 06/19/2024    CHOL 211 (H) 06/26/2023    CHOL 203 (H) 10/27/2021    TRIG 164 (H) 06/19/2024    TRIG 171 (H) 06/26/2023    TRIG 149 10/27/2021    HDL 48.1 06/19/2024    HDL 41.8 06/26/2023    HDL 52.6 10/27/2021    CHHDL 4.8 06/19/2024    CHHDL 5.0 06/26/2023    CHHDL 3.9 10/27/2021    LDLF 135 (H) 06/26/2023    LDLF 121 (H) 10/27/2021    VLDL 33 06/19/2024    VLDL 34 06/26/2023    VLDL 30 10/27/2021    NHDL 183 (H) 06/19/2024     TSH:   Lab Results   Component Value Date    TSH 0.79 06/19/2024    TSH 0.66 12/30/2023  "    PSA:   No results found for: \"PSA\"     Assessment/Plan   There are no diagnoses linked to this encounter.    "

## 2025-04-03 NOTE — PATIENT INSTRUCTIONS
It was nice to see you today!  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

## 2025-04-08 ENCOUNTER — HOSPITAL ENCOUNTER (OUTPATIENT)
Dept: RADIOLOGY | Facility: HOSPITAL | Age: 65
Discharge: HOME | End: 2025-04-08
Payer: COMMERCIAL

## 2025-04-08 DIAGNOSIS — E07.89 THYROID MASS OF UNCLEAR ETIOLOGY: ICD-10-CM

## 2025-04-08 PROCEDURE — 76536 US EXAM OF HEAD AND NECK: CPT | Performed by: RADIOLOGY

## 2025-04-08 PROCEDURE — 76536 US EXAM OF HEAD AND NECK: CPT

## 2025-04-09 ENCOUNTER — HOSPITAL ENCOUNTER (OUTPATIENT)
Dept: RADIOLOGY | Facility: HOSPITAL | Age: 65
Discharge: HOME | End: 2025-04-09
Payer: COMMERCIAL

## 2025-04-09 DIAGNOSIS — Z13.820 ENCOUNTER FOR OSTEOPOROSIS SCREENING IN ASYMPTOMATIC POSTMENOPAUSAL PATIENT: ICD-10-CM

## 2025-04-09 DIAGNOSIS — Z78.0 ENCOUNTER FOR OSTEOPOROSIS SCREENING IN ASYMPTOMATIC POSTMENOPAUSAL PATIENT: ICD-10-CM

## 2025-04-09 PROCEDURE — 77080 DXA BONE DENSITY AXIAL: CPT | Performed by: RADIOLOGY

## 2025-04-09 PROCEDURE — 77080 DXA BONE DENSITY AXIAL: CPT

## 2025-04-14 ENCOUNTER — OFFICE VISIT (OUTPATIENT)
Dept: PAIN MEDICINE | Facility: HOSPITAL | Age: 65
End: 2025-04-14
Payer: COMMERCIAL

## 2025-04-14 ENCOUNTER — HOSPITAL ENCOUNTER (OUTPATIENT)
Dept: RADIOLOGY | Facility: HOSPITAL | Age: 65
Discharge: HOME | End: 2025-04-14
Payer: COMMERCIAL

## 2025-04-14 DIAGNOSIS — E04.2 MULTIPLE THYROID NODULES: ICD-10-CM

## 2025-04-14 DIAGNOSIS — M25.552 LEFT HIP PAIN: ICD-10-CM

## 2025-04-14 DIAGNOSIS — M47.22 OSTEOARTHRITIS OF SPINE WITH RADICULOPATHY, CERVICAL REGION: ICD-10-CM

## 2025-04-14 DIAGNOSIS — M47.812 CERVICAL SPONDYLOSIS WITHOUT MYELOPATHY: ICD-10-CM

## 2025-04-14 DIAGNOSIS — M62.838 MUSCLE SPASMS OF NECK: ICD-10-CM

## 2025-04-14 DIAGNOSIS — M25.552 LEFT HIP PAIN: Primary | ICD-10-CM

## 2025-04-14 PROCEDURE — 99214 OFFICE O/P EST MOD 30 MIN: CPT | Performed by: PAIN MEDICINE

## 2025-04-14 PROCEDURE — 72050 X-RAY EXAM NECK SPINE 4/5VWS: CPT

## 2025-04-14 PROCEDURE — 72050 X-RAY EXAM NECK SPINE 4/5VWS: CPT | Performed by: RADIOLOGY

## 2025-04-14 PROCEDURE — 99204 OFFICE O/P NEW MOD 45 MIN: CPT | Performed by: PAIN MEDICINE

## 2025-04-14 PROCEDURE — 73502 X-RAY EXAM HIP UNI 2-3 VIEWS: CPT | Mod: LT

## 2025-04-14 NOTE — PROGRESS NOTES
Subjective   Patient ID: Lucia Shields is a 64 y.o. female with a past medical history of asthma, GERD, hyperparathyroidism s/p parathyroidectomy in 2004, stress incontinence, tinnitus of right ear who presents for chronic neck pain.     She was referred by her PCP after an ED visit on 3/31/25 for pain and numbness to first three fingers. Today, she has concerns for her neck pain but also lower left back pain. This pain occasionally radiates, and has only seen PT for her cervical pain.     The patient's pain symptoms are as follows:  Onset: Chronic  Location: Cervical Spine and Lower Lumbar Spine  Duration: Chronic  Characteristics: Dull achy pain, cervical region with occasionally radiculopathy  Aggravating factors: Movement  Alleviating factors: Robaxin, tylenol, ibuprofen etc  Radiation: bilateral upper extremities  Score: up to 6/10, average 4/10      Physical Therapy:  Patient currently is completing PT for cervical spine pain but not lumbar pain  Other Conservative Measures she has tried: Heating Pad and Ice  Classes of medications tried in the past: Acetaminophen, NSAIDs, and Muscle Relaxants    Review of Systems   13-point ROS done and negative except for HPI.     Current Outpatient Medications   Medication Instructions    albuterol 90 mcg/actuation inhaler INHALE 2 PUFFS VIA SPACER DEVICE EVERY 4 TO 6 HOURS AS NEEDED.  INHALE ONE PUFF THEN TAKE 5 - 6 EASY BREATHS  REST ONE MINUTE THEN REPEAT.    albuterol 2.5 mg, nebulization, 4 times daily PRN    citalopram (CELEXA) 20 mg, oral, Daily    fluticasone (Flonase) 50 mcg/actuation nasal spray nasal    melatonin 5 mg tablet,disintegrating oral    methocarbamol (ROBAXIN) 500 mg, oral, 4 times daily PRN    methylPREDNISolone (Medrol, Abhi,) 4 mg tablets Follow schedule on package instructions    montelukast (SINGULAIR) 10 mg, oral, Nightly    multivitamin tablet Every 24 hours    omeprazole (PRILOSEC) 40 mg, oral, Daily before breakfast    ondansetron ODT  (Zofran-ODT) 4 mg disintegrating tablet 1 tab(s) orally 3 times a day for 14 days    propranolol LA (INDERAL LA) 60 mg, oral, Daily    sucralfate (Carafate) 1 gram tablet TAKE ONE TABLET BY MOUTH FOUR TIMES A DAY  before meals and at bedtime    timolol (Timoptic) 0.5 % ophthalmic solution INSTILL ONE DROP EVERY DAY IN THE MORNING. INTO EACH EYE       Past Medical History:   Diagnosis Date    Abnormal MRI of the head 2023    COVID-19 2023    Diffuse pain in left lower extremity 2023    Ganglion of left wrist 2023    Headache disorder 2023    Multiple sites, insect bite, nonvenomous 2023    Nasal congestion with rhinorrhea 2023    Neck muscle spasm 2023    Personal history of other endocrine, nutritional and metabolic disease 2015    History of hyperparathyroidism    Personal history of other infectious and parasitic diseases 2015    History of hepatitis    Personal history of urinary calculi 2015    History of renal calculi        Past Surgical History:   Procedure Laterality Date     SECTION, CLASSIC  2015     Section    DILATION AND CURETTAGE OF UTERUS  2015    Dilation And Curettage    LITHOTRIPSY  2015    Renal Lithotripsy    OTHER SURGICAL HISTORY  2015    Parathyroid Resection        No family history on file.     Allergies   Allergen Reactions    Pollen Extracts Unknown     SINUS CONGESTION        Objective     There were no vitals filed for this visit.     Physical Exam  General: NAD, well groomed, well nourished  Eyes: Non-icteric sclera, EOMI  Ears, Nose, Mouth, and Throat: External ears and nose appear to be without deformity or rash. No lesions or masses noted. Hearing is grossly intact.   Neck: Trachea midline  Respiratory: Nonlabored breathing   Cardiovascular: no peripheral edema   Skin: No rashes or open lesions/ulcers identified on skin.    Back:   Palpation: No tenderness to palpation over  lumbar paraspinous muscles.   Straight leg raise: does not reproduce their pain, bilaterally   OVI Maneuver does not reproduce pain bilaterally    Hip: No pain over greater trochanters. and Pain not reproduced with hip internal/external rotation.     Neurologic:   Cranial nerves grossly intact.   Strength 5/5 and symmetric plantar/dorsiflexion   Sensation: Normal to light touch throughout  DTRs:normal and symmetric throughout  Andre: absent  Clonus: absent  Uses Assist Device: no    Psychiatric: Alert, orientation to person, place, and time. Cooperative.    Imaging   Moderate to severe disc space   narrowing C4-5 and C5-6 with endplate osteophytes from degenerative change including uncovertebral joint degenerative change.     No previous lumbar imaging.     Assessment/Plan   Lucia Shields is a 64 y.o. female with a past medical history of asthma, GERD, hyperparathyroidism s/p parathyroidectomy in 2004, stress incontinence, tinnitus of right ear who presents for chronic neck pain and left lower back pain.     Patient is currently seeing benefit with PT for cervical pain, but has not started PT for left lower back pain. Noted pain relief with robaxin, tylenol, and ibuprofen as well.     Plan:  - cervical spine X-Ray with flexion and extension  - EMG nerve conduction testing   - left hip x-ray   - discussed plan to follow up after imaging  - continue with current pain regimen and PT    Follow up: After X-rays    The patient was invited to contact us back anytime with any questions or concerns and follow-up with us in the office as needed.     Diagnoses and all orders for this visit:  Multiple thyroid nodules  Osteoarthritis of spine with radiculopathy, cervical region  -     Referral to Pain Medicine  Muscle spasms of neck  Left hip pain    This note was generated with the aid of dictation software, there may be typos despite my attempts at proofreading.   The patient was discussed and seen with Dr. Zhang.      Marcus Baker, DO PGY-1

## 2025-04-25 ENCOUNTER — APPOINTMENT (OUTPATIENT)
Dept: ORTHOPEDIC SURGERY | Facility: CLINIC | Age: 65
End: 2025-04-25
Payer: COMMERCIAL

## 2025-09-11 ENCOUNTER — APPOINTMENT (OUTPATIENT)
Dept: PRIMARY CARE | Facility: CLINIC | Age: 65
End: 2025-09-11
Payer: COMMERCIAL